# Patient Record
Sex: FEMALE | Race: WHITE | NOT HISPANIC OR LATINO
[De-identification: names, ages, dates, MRNs, and addresses within clinical notes are randomized per-mention and may not be internally consistent; named-entity substitution may affect disease eponyms.]

---

## 2017-03-06 ENCOUNTER — APPOINTMENT (OUTPATIENT)
Dept: CARDIOLOGY | Facility: CLINIC | Age: 82
End: 2017-03-06

## 2017-03-06 ENCOUNTER — NON-APPOINTMENT (OUTPATIENT)
Age: 82
End: 2017-03-06

## 2017-03-06 VITALS
HEIGHT: 58 IN | DIASTOLIC BLOOD PRESSURE: 64 MMHG | WEIGHT: 138 LBS | BODY MASS INDEX: 28.97 KG/M2 | HEART RATE: 73 BPM | OXYGEN SATURATION: 95 % | SYSTOLIC BLOOD PRESSURE: 116 MMHG

## 2017-03-06 DIAGNOSIS — C92.10 CHRONIC MYELOID LEUKEMIA, BCR/ABL-POSITIVE, NOT HAVING ACHIEVED REMISSION: ICD-10-CM

## 2017-03-06 DIAGNOSIS — Z45.018 ENCOUNTER FOR ADJUSTMENT AND MANAGEMENT OF OTHER PART OF CARDIAC PACEMAKER: ICD-10-CM

## 2017-04-06 ENCOUNTER — EMERGENCY (EMERGENCY)
Facility: HOSPITAL | Age: 82
LOS: 0 days | Discharge: HOME | End: 2017-04-06
Attending: EMERGENCY MEDICINE
Payer: MEDICARE

## 2017-04-06 VITALS
TEMPERATURE: 97 F | WEIGHT: 130.07 LBS | SYSTOLIC BLOOD PRESSURE: 103 MMHG | OXYGEN SATURATION: 94 % | HEIGHT: 59 IN | HEART RATE: 87 BPM | DIASTOLIC BLOOD PRESSURE: 76 MMHG | RESPIRATION RATE: 18 BRPM

## 2017-04-06 VITALS
SYSTOLIC BLOOD PRESSURE: 131 MMHG | RESPIRATION RATE: 19 BRPM | OXYGEN SATURATION: 100 % | TEMPERATURE: 98 F | HEART RATE: 80 BPM | DIASTOLIC BLOOD PRESSURE: 52 MMHG

## 2017-04-06 DIAGNOSIS — S81.811A LACERATION WITHOUT FOREIGN BODY, RIGHT LOWER LEG, INITIAL ENCOUNTER: ICD-10-CM
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
## 2017-04-06 DIAGNOSIS — Z79.82 LONG TERM (CURRENT) USE OF ASPIRIN: ICD-10-CM

## 2017-04-06 PROCEDURE — 99283 EMERGENCY DEPT VISIT LOW MDM: CPT

## 2017-04-06 NOTE — ED PROVIDER NOTE - PHYSICAL EXAMINATION
skin - large 5x4 cm skin tear mid anterior shin, minimally oozing. posterior right like is a clean rose sized ulceration that appears chronic. the right toes are also bandaged with cdi dressing. numerous surgical scars.   neuro - alert and oriented  msk - no bony ttp of the right leg.   gen - eldery, frail, no distress

## 2017-04-06 NOTE — ED PROVIDER NOTE - OBJECTIVE STATEMENT
Pertinent PMH/PSH/FHx/SHx and Review of Systems contained within:  89F hx of PVD pw skin tear right anterior shin. overnight, she slipped as her  was unable to successfully navigate her from the wheel chair to the bed. patient did not hit head or sustain loc. tetanus within the past 2 years  Fh and Sh not otherwise contributory  ROS otherwise negative

## 2017-04-06 NOTE — ED ADULT NURSE REASSESSMENT NOTE - NS ED NURSE REASSESS COMMENT FT1
pt awake, alert, right leg bandage with xeroform dressing and kerlex placed by md on arrival. family at bedside. seen by sw. awaiting ambulance home.

## 2017-04-06 NOTE — ED ADULT NURSE REASSESSMENT NOTE - NS ED NURSE REASSESS COMMENT FT1
pt full self ambulatory with steady gait, alert, orient x 3. belongings returned by security. pt refuse to sign for property and reufse to sign discharge orders

## 2017-04-06 NOTE — ED ADULT NURSE NOTE - PMH
Anemia    CAD (coronary artery disease)    CML (chronic myelocytic leukemia)    Macular degeneration    Osteoarthritis    Pacemaker    Sick sinus syndrome

## 2017-05-10 ENCOUNTER — NON-APPOINTMENT (OUTPATIENT)
Age: 82
End: 2017-05-10

## 2017-05-10 ENCOUNTER — APPOINTMENT (OUTPATIENT)
Dept: CARDIOLOGY | Facility: CLINIC | Age: 82
End: 2017-05-10

## 2017-05-10 VITALS
HEART RATE: 73 BPM | HEIGHT: 58 IN | WEIGHT: 138 LBS | OXYGEN SATURATION: 95 % | SYSTOLIC BLOOD PRESSURE: 118 MMHG | BODY MASS INDEX: 28.97 KG/M2 | DIASTOLIC BLOOD PRESSURE: 60 MMHG

## 2017-05-10 DIAGNOSIS — I10 ESSENTIAL (PRIMARY) HYPERTENSION: ICD-10-CM

## 2017-05-10 DIAGNOSIS — Z01.810 ENCOUNTER FOR PREPROCEDURAL CARDIOVASCULAR EXAMINATION: ICD-10-CM

## 2017-05-10 DIAGNOSIS — I35.0 NONRHEUMATIC AORTIC (VALVE) STENOSIS: ICD-10-CM

## 2017-05-10 DIAGNOSIS — I50.32 CHRONIC DIASTOLIC (CONGESTIVE) HEART FAILURE: ICD-10-CM

## 2017-05-10 DIAGNOSIS — Z95.810 PRESENCE OF AUTOMATIC (IMPLANTABLE) CARDIAC DEFIBRILLATOR: ICD-10-CM

## 2017-05-23 PROBLEM — Z01.810 PREOPERATIVE CARDIOVASCULAR EXAMINATION: Status: ACTIVE | Noted: 2017-05-23

## 2017-06-07 ENCOUNTER — APPOINTMENT (OUTPATIENT)
Dept: CARDIOLOGY | Facility: CLINIC | Age: 82
End: 2017-06-07

## 2017-06-25 ENCOUNTER — EMERGENCY (EMERGENCY)
Facility: HOSPITAL | Age: 82
LOS: 0 days | Discharge: ANOTHER HOSPITAL | End: 2017-06-26
Attending: EMERGENCY MEDICINE
Payer: MEDICARE

## 2017-06-25 VITALS
HEART RATE: 73 BPM | RESPIRATION RATE: 17 BRPM | TEMPERATURE: 98 F | SYSTOLIC BLOOD PRESSURE: 110 MMHG | HEIGHT: 60 IN | WEIGHT: 110.01 LBS | OXYGEN SATURATION: 100 % | DIASTOLIC BLOOD PRESSURE: 47 MMHG

## 2017-06-25 VITALS
SYSTOLIC BLOOD PRESSURE: 107 MMHG | RESPIRATION RATE: 18 BRPM | TEMPERATURE: 97 F | OXYGEN SATURATION: 97 % | DIASTOLIC BLOOD PRESSURE: 56 MMHG | HEART RATE: 73 BPM

## 2017-06-25 LAB
ALBUMIN SERPL ELPH-MCNC: 2.8 G/DL — LOW (ref 3.3–5)
ALP SERPL-CCNC: 116 U/L — SIGNIFICANT CHANGE UP (ref 40–120)
ALT FLD-CCNC: 21 U/L — SIGNIFICANT CHANGE UP (ref 12–78)
ANION GAP SERPL CALC-SCNC: 11 MMOL/L — SIGNIFICANT CHANGE UP (ref 5–17)
ANISOCYTOSIS BLD QL: SLIGHT — SIGNIFICANT CHANGE UP
APPEARANCE UR: CLEAR — SIGNIFICANT CHANGE UP
APTT BLD: 28.7 SEC — SIGNIFICANT CHANGE UP (ref 27.5–37.4)
AST SERPL-CCNC: 17 U/L — SIGNIFICANT CHANGE UP (ref 15–37)
BACTERIA # UR AUTO: ABNORMAL
BILIRUB SERPL-MCNC: 0.3 MG/DL — SIGNIFICANT CHANGE UP (ref 0.2–1.2)
BILIRUB UR-MCNC: NEGATIVE — SIGNIFICANT CHANGE UP
BLD GP AB SCN SERPL QL: SIGNIFICANT CHANGE UP
BUN SERPL-MCNC: 48 MG/DL — HIGH (ref 7–23)
CALCIUM SERPL-MCNC: 7.8 MG/DL — LOW (ref 8.5–10.1)
CHLORIDE SERPL-SCNC: 112 MMOL/L — HIGH (ref 96–108)
CK MB BLD-MCNC: 5.2 % — HIGH (ref 0–3.5)
CK MB CFR SERPL CALC: 4.7 NG/ML — HIGH (ref 0.5–3.6)
CK SERPL-CCNC: 91 U/L — SIGNIFICANT CHANGE UP (ref 26–192)
CO2 SERPL-SCNC: 21 MMOL/L — LOW (ref 22–31)
COLOR SPEC: YELLOW — SIGNIFICANT CHANGE UP
CREAT SERPL-MCNC: 1.85 MG/DL — HIGH (ref 0.5–1.3)
DIFF PNL FLD: NEGATIVE — SIGNIFICANT CHANGE UP
EOSINOPHIL NFR BLD AUTO: 3 % — SIGNIFICANT CHANGE UP (ref 0–6)
EPI CELLS # UR: SIGNIFICANT CHANGE UP
GLUCOSE SERPL-MCNC: 116 MG/DL — HIGH (ref 70–99)
GLUCOSE UR QL: NEGATIVE MG/DL — SIGNIFICANT CHANGE UP
HCT VFR BLD CALC: 27.2 % — LOW (ref 34.5–45)
HGB BLD-MCNC: 9.5 G/DL — LOW (ref 11.5–15.5)
HYPOCHROMIA BLD QL: SLIGHT — SIGNIFICANT CHANGE UP
INR BLD: 1.24 RATIO — HIGH (ref 0.88–1.16)
KETONES UR-MCNC: NEGATIVE — SIGNIFICANT CHANGE UP
LACTATE SERPL-SCNC: 0.8 MMOL/L — SIGNIFICANT CHANGE UP (ref 0.7–2)
LEUKOCYTE ESTERASE UR-ACNC: ABNORMAL
LYMPHOCYTES # BLD AUTO: 14 % — SIGNIFICANT CHANGE UP (ref 13–44)
MACROCYTES BLD QL: SLIGHT — SIGNIFICANT CHANGE UP
MCHC RBC-ENTMCNC: 35.2 GM/DL — SIGNIFICANT CHANGE UP (ref 32–36)
MCHC RBC-ENTMCNC: 36.3 PG — HIGH (ref 27–34)
MCV RBC AUTO: 103.2 FL — HIGH (ref 80–100)
MICROCYTES BLD QL: SLIGHT — SIGNIFICANT CHANGE UP
MONOCYTES NFR BLD AUTO: 4 % — SIGNIFICANT CHANGE UP (ref 2–14)
NEUTROPHILS NFR BLD AUTO: 79 % — HIGH (ref 43–77)
NITRITE UR-MCNC: NEGATIVE — SIGNIFICANT CHANGE UP
NRBC # BLD: 1 /100 — HIGH (ref 0–0)
OVALOCYTES BLD QL SMEAR: SLIGHT — SIGNIFICANT CHANGE UP
PH UR: 5 — SIGNIFICANT CHANGE UP (ref 5–8)
PLAT MORPH BLD: NORMAL — SIGNIFICANT CHANGE UP
PLATELET # BLD AUTO: 151 K/UL — SIGNIFICANT CHANGE UP (ref 150–400)
POLYCHROMASIA BLD QL SMEAR: SLIGHT — SIGNIFICANT CHANGE UP
POTASSIUM SERPL-MCNC: 4 MMOL/L — SIGNIFICANT CHANGE UP (ref 3.5–5.3)
POTASSIUM SERPL-SCNC: 4 MMOL/L — SIGNIFICANT CHANGE UP (ref 3.5–5.3)
PROT SERPL-MCNC: 6.2 GM/DL — SIGNIFICANT CHANGE UP (ref 6–8.3)
PROT UR-MCNC: NEGATIVE MG/DL — SIGNIFICANT CHANGE UP
PROTHROM AB SERPL-ACNC: 13.6 SEC — HIGH (ref 9.8–12.7)
RBC # BLD: 2.63 M/UL — LOW (ref 3.8–5.2)
RBC # FLD: 14.1 % — SIGNIFICANT CHANGE UP (ref 11–15)
RBC BLD AUTO: ABNORMAL
SODIUM SERPL-SCNC: 144 MMOL/L — SIGNIFICANT CHANGE UP (ref 135–145)
SP GR SPEC: 1.01 — SIGNIFICANT CHANGE UP (ref 1.01–1.02)
TROPONIN I SERPL-MCNC: 0.06 NG/ML — HIGH (ref 0.01–0.04)
UROBILINOGEN FLD QL: NEGATIVE MG/DL — SIGNIFICANT CHANGE UP
WBC # BLD: 9.6 K/UL — SIGNIFICANT CHANGE UP (ref 3.8–10.5)
WBC # FLD AUTO: 9.6 K/UL — SIGNIFICANT CHANGE UP (ref 3.8–10.5)
WBC UR QL: ABNORMAL

## 2017-06-25 PROCEDURE — 71010: CPT | Mod: 26

## 2017-06-25 PROCEDURE — 76377 3D RENDER W/INTRP POSTPROCES: CPT | Mod: 26

## 2017-06-25 PROCEDURE — 73130 X-RAY EXAM OF HAND: CPT | Mod: 26,RT

## 2017-06-25 PROCEDURE — 72100 X-RAY EXAM L-S SPINE 2/3 VWS: CPT | Mod: 26

## 2017-06-25 PROCEDURE — 74176 CT ABD & PELVIS W/O CONTRAST: CPT | Mod: 26

## 2017-06-25 PROCEDURE — 70450 CT HEAD/BRAIN W/O DYE: CPT | Mod: 26

## 2017-06-25 PROCEDURE — 99285 EMERGENCY DEPT VISIT HI MDM: CPT

## 2017-06-25 PROCEDURE — 72125 CT NECK SPINE W/O DYE: CPT | Mod: 26

## 2017-06-25 RX ORDER — SODIUM CHLORIDE 9 MG/ML
500 INJECTION INTRAMUSCULAR; INTRAVENOUS; SUBCUTANEOUS ONCE
Qty: 0 | Refills: 0 | Status: COMPLETED | OUTPATIENT
Start: 2017-06-25 | End: 2017-06-25

## 2017-06-25 RX ORDER — SODIUM CHLORIDE 9 MG/ML
3 INJECTION INTRAMUSCULAR; INTRAVENOUS; SUBCUTANEOUS ONCE
Qty: 0 | Refills: 0 | Status: COMPLETED | OUTPATIENT
Start: 2017-06-25 | End: 2017-06-25

## 2017-06-25 RX ADMIN — SODIUM CHLORIDE 3 MILLILITER(S): 9 INJECTION INTRAMUSCULAR; INTRAVENOUS; SUBCUTANEOUS at 22:08

## 2017-06-25 NOTE — ED PROVIDER NOTE - PHYSICAL EXAMINATION
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    BACK:  nt midline c/t spine. ttp @ L4/5  Skin: skin tears to right hand.  ecchymoses over arms and legs.  purulent discharge from right foot  Neuro: AAOx2, no sensory/motor deficits, CN 2-12 intact

## 2017-06-25 NOTE — ED ADULT NURSE NOTE - OBJECTIVE STATEMENT
Pt c/o generalized fall s/p at home on walker. Pt presented with a lac to rt hand from fal.. Pt has hx of poor circulation to bilateral lower extremity ;hence unhealing wounds/cellulities/edema to lower extremity.

## 2017-06-25 NOTE — ED PROVIDER NOTE - OBJECTIVE STATEMENT
89yoF; with pmh signif for CML (in remission, on Glevac and Procrit injections), HTN, HLD, CAD, Sick Sinus Syndrome(s/p PPM); now presenting s/p fall.  patient with 3 falls this week.  patient states she may have tripped over walker but not sure. unknown head trauma.  c/o generalized weakness over past week.  denies cp/sob/palp. denies f/c/s. denies cough. denies abd pain. c/o lower back pain and right hand pain.

## 2017-06-25 NOTE — ED PROVIDER NOTE - CARE PLAN
Principal Discharge DX:	NSTEMI, initial episode of care  Secondary Diagnosis:	Unsteady gait Principal Discharge DX:	NSTEMI, initial episode of care  Secondary Diagnosis:	Unsteady gait  Secondary Diagnosis:	Pelvic fracture

## 2017-06-25 NOTE — ED ADULT TRIAGE NOTE - CHIEF COMPLAINT QUOTE
Patient BIBA: patient reports "I got tangled in my walker. Everything hurts." Patient with skin tear to Right hand. Patient denies striking head.

## 2017-06-26 ENCOUNTER — INPATIENT (INPATIENT)
Facility: HOSPITAL | Age: 82
LOS: 1 days | Discharge: HOSPICE | DRG: 535 | End: 2017-06-28
Attending: SURGERY | Admitting: SURGERY
Payer: MEDICARE

## 2017-06-26 VITALS
DIASTOLIC BLOOD PRESSURE: 58 MMHG | OXYGEN SATURATION: 100 % | HEART RATE: 82 BPM | SYSTOLIC BLOOD PRESSURE: 75 MMHG | TEMPERATURE: 98 F | RESPIRATION RATE: 24 BRPM

## 2017-06-26 DIAGNOSIS — Z95.810 PRESENCE OF AUTOMATIC (IMPLANTABLE) CARDIAC DEFIBRILLATOR: Chronic | ICD-10-CM

## 2017-06-26 DIAGNOSIS — S32.9XXA FRACTURE OF UNSPECIFIED PARTS OF LUMBOSACRAL SPINE AND PELVIS, INITIAL ENCOUNTER FOR CLOSED FRACTURE: ICD-10-CM

## 2017-06-26 DIAGNOSIS — T14.90 INJURY, UNSPECIFIED: ICD-10-CM

## 2017-06-26 LAB
ALBUMIN SERPL ELPH-MCNC: 3 G/DL — LOW (ref 3.3–5)
ALP SERPL-CCNC: 95 U/L — SIGNIFICANT CHANGE UP (ref 40–120)
ALT FLD-CCNC: 39 U/L RC — SIGNIFICANT CHANGE UP (ref 10–45)
ANION GAP SERPL CALC-SCNC: 9 MMOL/L — SIGNIFICANT CHANGE UP (ref 5–17)
ANISOCYTOSIS BLD QL: SLIGHT — SIGNIFICANT CHANGE UP
APTT BLD: 31.3 SEC — SIGNIFICANT CHANGE UP (ref 27.5–37.4)
AST SERPL-CCNC: 41 U/L — HIGH (ref 10–40)
BASOPHILS # BLD AUTO: 0 K/UL — SIGNIFICANT CHANGE UP (ref 0–0.2)
BASOPHILS NFR BLD AUTO: 0.4 % — SIGNIFICANT CHANGE UP (ref 0–2)
BILIRUB SERPL-MCNC: 1 MG/DL — SIGNIFICANT CHANGE UP (ref 0.2–1.2)
BLD GP AB SCN SERPL QL: NEGATIVE — SIGNIFICANT CHANGE UP
BUN SERPL-MCNC: 47 MG/DL — HIGH (ref 7–23)
CALCIUM SERPL-MCNC: 7.7 MG/DL — LOW (ref 8.4–10.5)
CHLORIDE SERPL-SCNC: 110 MMOL/L — HIGH (ref 96–108)
CK SERPL-CCNC: 105 U/L — SIGNIFICANT CHANGE UP (ref 25–170)
CO2 SERPL-SCNC: 23 MMOL/L — SIGNIFICANT CHANGE UP (ref 22–31)
CREAT SERPL-MCNC: 1.62 MG/DL — HIGH (ref 0.5–1.3)
CRP SERPL-MCNC: 3.4 MG/DL — HIGH (ref 0–0.4)
EOSINOPHIL # BLD AUTO: 0.3 K/UL — SIGNIFICANT CHANGE UP (ref 0–0.5)
EOSINOPHIL NFR BLD AUTO: 2.7 % — SIGNIFICANT CHANGE UP (ref 0–6)
ERYTHROCYTE [SEDIMENTATION RATE] IN BLOOD: 23 MM/HR — HIGH (ref 0–20)
GAS PNL BLDA: SIGNIFICANT CHANGE UP
GLUCOSE SERPL-MCNC: 135 MG/DL — HIGH (ref 70–99)
HCT VFR BLD CALC: 27.9 % — LOW (ref 34.5–45)
HCT VFR BLD CALC: 29.5 % — LOW (ref 34.5–45)
HGB BLD-MCNC: 9.4 G/DL — LOW (ref 11.5–15.5)
HGB BLD-MCNC: 9.8 G/DL — LOW (ref 11.5–15.5)
INR BLD: 1.32 RATIO — HIGH (ref 0.88–1.16)
LYMPHOCYTES # BLD AUTO: 1.3 K/UL — SIGNIFICANT CHANGE UP (ref 1–3.3)
LYMPHOCYTES # BLD AUTO: 12.2 % — LOW (ref 13–44)
MACROCYTES BLD QL: SLIGHT — SIGNIFICANT CHANGE UP
MCHC RBC-ENTMCNC: 33.2 GM/DL — SIGNIFICANT CHANGE UP (ref 32–36)
MCHC RBC-ENTMCNC: 33.7 GM/DL — SIGNIFICANT CHANGE UP (ref 32–36)
MCHC RBC-ENTMCNC: 35.3 PG — HIGH (ref 27–34)
MCHC RBC-ENTMCNC: 35.8 PG — HIGH (ref 27–34)
MCV RBC AUTO: 106 FL — HIGH (ref 80–100)
MCV RBC AUTO: 106 FL — HIGH (ref 80–100)
MONOCYTES # BLD AUTO: 1.2 K/UL — HIGH (ref 0–0.9)
MONOCYTES NFR BLD AUTO: 11 % — SIGNIFICANT CHANGE UP (ref 2–14)
NEUTROPHILS # BLD AUTO: 8.1 K/UL — HIGH (ref 1.8–7.4)
NEUTROPHILS NFR BLD AUTO: 73.8 % — SIGNIFICANT CHANGE UP (ref 43–77)
NT-PROBNP SERPL-SCNC: HIGH PG/ML (ref 0–300)
OVALOCYTES BLD QL SMEAR: SLIGHT — SIGNIFICANT CHANGE UP
PLAT MORPH BLD: NORMAL — SIGNIFICANT CHANGE UP
PLATELET # BLD AUTO: 103 K/UL — LOW (ref 150–400)
PLATELET # BLD AUTO: 145 K/UL — LOW (ref 150–400)
POIKILOCYTOSIS BLD QL AUTO: SLIGHT — SIGNIFICANT CHANGE UP
POTASSIUM SERPL-MCNC: 3.7 MMOL/L — SIGNIFICANT CHANGE UP (ref 3.5–5.3)
POTASSIUM SERPL-SCNC: 3.7 MMOL/L — SIGNIFICANT CHANGE UP (ref 3.5–5.3)
PROT SERPL-MCNC: 5.7 G/DL — LOW (ref 6–8.3)
PROTHROM AB SERPL-ACNC: 14.3 SEC — HIGH (ref 9.8–12.7)
RBC # BLD: 2.62 M/UL — LOW (ref 3.8–5.2)
RBC # BLD: 2.77 M/UL — LOW (ref 3.8–5.2)
RBC # FLD: 16.8 % — HIGH (ref 10.3–14.5)
RBC # FLD: 17 % — HIGH (ref 10.3–14.5)
RBC BLD AUTO: SIGNIFICANT CHANGE UP
RH IG SCN BLD-IMP: POSITIVE — SIGNIFICANT CHANGE UP
RH IG SCN BLD-IMP: POSITIVE — SIGNIFICANT CHANGE UP
SODIUM SERPL-SCNC: 142 MMOL/L — SIGNIFICANT CHANGE UP (ref 135–145)
TROPONIN T SERPL-MCNC: 0.06 NG/ML — SIGNIFICANT CHANGE UP (ref 0–0.06)
WBC # BLD: 10.9 K/UL — HIGH (ref 3.8–10.5)
WBC # BLD: 9.6 K/UL — SIGNIFICANT CHANGE UP (ref 3.8–10.5)
WBC # FLD AUTO: 10.9 K/UL — HIGH (ref 3.8–10.5)
WBC # FLD AUTO: 9.6 K/UL — SIGNIFICANT CHANGE UP (ref 3.8–10.5)

## 2017-06-26 PROCEDURE — 72125 CT NECK SPINE W/O DYE: CPT | Mod: 26

## 2017-06-26 PROCEDURE — 74177 CT ABD & PELVIS W/CONTRAST: CPT | Mod: 26

## 2017-06-26 PROCEDURE — 99291 CRITICAL CARE FIRST HOUR: CPT

## 2017-06-26 PROCEDURE — 99292 CRITICAL CARE ADDL 30 MIN: CPT

## 2017-06-26 PROCEDURE — 71260 CT THORAX DX C+: CPT | Mod: 26

## 2017-06-26 PROCEDURE — 70450 CT HEAD/BRAIN W/O DYE: CPT | Mod: 26

## 2017-06-26 PROCEDURE — 99291 CRITICAL CARE FIRST HOUR: CPT | Mod: 25

## 2017-06-26 PROCEDURE — 71010: CPT | Mod: 26

## 2017-06-26 PROCEDURE — 73630 X-RAY EXAM OF FOOT: CPT | Mod: 26,RT

## 2017-06-26 RX ORDER — ACETAMINOPHEN 500 MG
1000 TABLET ORAL ONCE
Qty: 0 | Refills: 0 | Status: COMPLETED | OUTPATIENT
Start: 2017-06-26 | End: 2017-06-26

## 2017-06-26 RX ORDER — SODIUM CHLORIDE 9 MG/ML
1000 INJECTION, SOLUTION INTRAVENOUS
Qty: 0 | Refills: 0 | Status: DISCONTINUED | OUTPATIENT
Start: 2017-06-26 | End: 2017-06-26

## 2017-06-26 RX ORDER — MIDAZOLAM HYDROCHLORIDE 1 MG/ML
1 INJECTION, SOLUTION INTRAMUSCULAR; INTRAVENOUS ONCE
Qty: 0 | Refills: 0 | Status: DISCONTINUED | OUTPATIENT
Start: 2017-06-26 | End: 2017-06-26

## 2017-06-26 RX ORDER — FENTANYL CITRATE 50 UG/ML
25 INJECTION INTRAVENOUS ONCE
Qty: 0 | Refills: 0 | Status: DISCONTINUED | OUTPATIENT
Start: 2017-06-26 | End: 2017-06-26

## 2017-06-26 RX ORDER — TETANUS TOXOID, REDUCED DIPHTHERIA TOXOID AND ACELLULAR PERTUSSIS VACCINE, ADSORBED 5; 2.5; 8; 8; 2.5 [IU]/.5ML; [IU]/.5ML; UG/.5ML; UG/.5ML; UG/.5ML
0.5 SUSPENSION INTRAMUSCULAR ONCE
Qty: 0 | Refills: 0 | Status: COMPLETED | OUTPATIENT
Start: 2017-06-26 | End: 2017-06-26

## 2017-06-26 RX ORDER — HYDROMORPHONE HYDROCHLORIDE 2 MG/ML
0.25 INJECTION INTRAMUSCULAR; INTRAVENOUS; SUBCUTANEOUS ONCE
Qty: 0 | Refills: 0 | Status: DISCONTINUED | OUTPATIENT
Start: 2017-06-26 | End: 2017-06-26

## 2017-06-26 RX ORDER — DESMOPRESSIN ACETATE 0.1 MG/1
20 TABLET ORAL ONCE
Qty: 0 | Refills: 0 | Status: COMPLETED | OUTPATIENT
Start: 2017-06-26 | End: 2017-06-26

## 2017-06-26 RX ORDER — POTASSIUM CHLORIDE 20 MEQ
10 PACKET (EA) ORAL
Qty: 0 | Refills: 0 | Status: COMPLETED | OUTPATIENT
Start: 2017-06-26 | End: 2017-06-26

## 2017-06-26 RX ORDER — LIDOCAINE 4 G/100G
1 CREAM TOPICAL DAILY
Qty: 0 | Refills: 0 | Status: DISCONTINUED | OUTPATIENT
Start: 2017-06-26 | End: 2017-06-28

## 2017-06-26 RX ORDER — DESMOPRESSIN ACETATE 0.1 MG/1
20 TABLET ORAL ONCE
Qty: 0 | Refills: 0 | Status: DISCONTINUED | OUTPATIENT
Start: 2017-06-26 | End: 2017-06-26

## 2017-06-26 RX ORDER — ACETAMINOPHEN 500 MG
650 TABLET ORAL EVERY 6 HOURS
Qty: 0 | Refills: 0 | Status: DISCONTINUED | OUTPATIENT
Start: 2017-06-26 | End: 2017-06-26

## 2017-06-26 RX ORDER — DEXTROSE MONOHYDRATE, SODIUM CHLORIDE, AND POTASSIUM CHLORIDE 50; .745; 4.5 G/1000ML; G/1000ML; G/1000ML
1000 INJECTION, SOLUTION INTRAVENOUS
Qty: 0 | Refills: 0 | Status: DISCONTINUED | OUTPATIENT
Start: 2017-06-26 | End: 2017-06-27

## 2017-06-26 RX ORDER — ACETAMINOPHEN 500 MG
650 TABLET ORAL EVERY 6 HOURS
Qty: 0 | Refills: 0 | Status: DISCONTINUED | OUTPATIENT
Start: 2017-06-26 | End: 2017-06-28

## 2017-06-26 RX ORDER — HYDROMORPHONE HYDROCHLORIDE 2 MG/ML
0.25 INJECTION INTRAMUSCULAR; INTRAVENOUS; SUBCUTANEOUS
Qty: 0 | Refills: 0 | Status: DISCONTINUED | OUTPATIENT
Start: 2017-06-26 | End: 2017-06-27

## 2017-06-26 RX ADMIN — TETANUS TOXOID, REDUCED DIPHTHERIA TOXOID AND ACELLULAR PERTUSSIS VACCINE, ADSORBED 0.5 MILLILITER(S): 5; 2.5; 8; 8; 2.5 SUSPENSION INTRAMUSCULAR at 00:27

## 2017-06-26 RX ADMIN — Medication 400 MILLIGRAM(S): at 10:31

## 2017-06-26 RX ADMIN — Medication 1000 MILLIGRAM(S): at 14:14

## 2017-06-26 RX ADMIN — Medication 650 MILLIGRAM(S): at 14:15

## 2017-06-26 RX ADMIN — FENTANYL CITRATE 25 MICROGRAM(S): 50 INJECTION INTRAVENOUS at 00:24

## 2017-06-26 RX ADMIN — HYDROMORPHONE HYDROCHLORIDE 0.25 MILLIGRAM(S): 2 INJECTION INTRAMUSCULAR; INTRAVENOUS; SUBCUTANEOUS at 05:30

## 2017-06-26 RX ADMIN — Medication 650 MILLIGRAM(S): at 21:12

## 2017-06-26 RX ADMIN — LIDOCAINE 1 PATCH: 4 CREAM TOPICAL at 12:15

## 2017-06-26 RX ADMIN — DEXTROSE MONOHYDRATE, SODIUM CHLORIDE, AND POTASSIUM CHLORIDE 50 MILLILITER(S): 50; .745; 4.5 INJECTION, SOLUTION INTRAVENOUS at 06:32

## 2017-06-26 RX ADMIN — SODIUM CHLORIDE 500 MILLILITER(S): 9 INJECTION INTRAMUSCULAR; INTRAVENOUS; SUBCUTANEOUS at 00:24

## 2017-06-26 RX ADMIN — Medication 100 MILLIEQUIVALENT(S): at 08:30

## 2017-06-26 RX ADMIN — HYDROMORPHONE HYDROCHLORIDE 0.25 MILLIGRAM(S): 2 INJECTION INTRAMUSCULAR; INTRAVENOUS; SUBCUTANEOUS at 05:10

## 2017-06-26 RX ADMIN — DESMOPRESSIN ACETATE 220 MICROGRAM(S): 0.1 TABLET ORAL at 03:20

## 2017-06-26 RX ADMIN — Medication 100 MILLIEQUIVALENT(S): at 09:14

## 2017-06-26 RX ADMIN — Medication 100 MILLIEQUIVALENT(S): at 06:37

## 2017-06-26 RX ADMIN — MIDAZOLAM HYDROCHLORIDE 1 MILLIGRAM(S): 1 INJECTION, SOLUTION INTRAMUSCULAR; INTRAVENOUS at 02:42

## 2017-06-26 NOTE — H&P ADULT - PROBLEM SELECTOR PLAN 1
-Admit to SICU  -NPO  -Confirm medication list and past medical history with family and PMD  -Trend Hct, Transfuse PRN  -Continue pelvic binder  -Incentive Spirometer  -Repeat labs  -Advance otoole further into bladder  -Ortho consult for pelvic fractures  -Discussed with attending

## 2017-06-26 NOTE — CONSULT NOTE ADULT - ASSESSMENT
89F s/p fall w/ L 8-9 rib fx, R iliac wing fracture, R flank hematoma    Neuro: Pain control w/ IV tylenol, lidoderm patch, low dose narcotics PRN. Monitor for mental status changes    Resp: OOB, Pulmonary toilet, Incentive spirometry    CV: mIVF. Monitor for hypotension/tachycardia. Avoid beta-blockers at this time.    GI: NPO for now.    : Monitor UOP. C/w otoole. Replete electrolytes.    Heme: Trend CBC. Hold VTE chemoppx at this time until H/H stable.     ID: Monitor    Endo: Monitor BMP glucose    Dispo: DNR. Will likely obtain palliative care consult for goals of care discussion. Admit to SICU

## 2017-06-26 NOTE — H&P ADULT - PMH
CML (chronic myeloid leukemia)    Coronary artery disease    Macular degeneration    Osteoarthritis    Pacemaker    Sick sinus syndrome

## 2017-06-26 NOTE — H&P ADULT - HISTORY OF PRESENT ILLNESS
89F s/p multiple mechanical falls this week. +Head trauma. +LOC. GCS 13. During primary survey pt's SBP was in the 60s-70s. Pt received 3 liters IVF, 2 units pRBCs, and 1 unit FFP. Her blood pressure responded. A R IJ central line was placed for additional access. After blood pressure was stabilized primary survey was intact. Secondary survey notable for ecchymosis on b/l UE, skin tears on R hand and R knee, dried blood in  R ear, pelvic binder in place.     Unable to obtain most of medical history. Medication reconciliation will need to be completed in the morning. ER attending discussed with family over the phone and pt was made DNR/DNI.

## 2017-06-26 NOTE — ED ADULT NURSE REASSESSMENT NOTE - NS ED NURSE REASSESS COMMENT FT1
pt admitted and accepted to SICU, report given at 0455 to HANNA Mar. pts bp varies between normal and HTN. DNR/DNI in chart signed and completed. blood transfusion consent signed and in pts chart. unit of platelets infused and completed at 0450, RN Zaid made aware in SICU. repeat VS to be completed at 0520. pt pending transfer.

## 2017-06-26 NOTE — ED PROVIDER NOTE - CRITICAL CARE PROVIDED
conducted a detailed discussion of DNR status/telephone consultation with the patient's family/consult w/ pt's family directly relating to pts condition/additional history taking/direct patient care (not related to procedure)/documentation/interpretation of diagnostic studies

## 2017-06-26 NOTE — ED ADULT NURSE REASSESSMENT NOTE - NS ED NURSE REASSESS COMMENT FT1
pt awake not responsive to verbal commands, responsive to painful stimuli. remains on nrb at 100% RA. pt administered 20 mcg of DDAVP at 0320, tolerated well. unit of platelets currently infusing. pt tba. pending admission. bp stable. abdominal and pelvic binder remains in place, otoole patent and draining to gravity. central line dressing clean, dry and intact, all ports patent and flushes well. all port clamped. 40 mg of lasix administered IVP as per ED attending Marlyn for possible overload d/t blood products administration. lab work post PRBC infusions with hgb of 9.8. type and screens obtained and blood type resulted. safety precautions maintained and comfort measures provided.

## 2017-06-26 NOTE — ED PROVIDER NOTE - OBJECTIVE STATEMENT
Chata Saunders M.D: 89yoF presenting as transfer from [facility] for trauma, multiple pelvic fractures level 2 trauma called on arrival.   A- airway intact, pt moaning making noise  B- b/l breath sounds  C- hypotensive bps 70s/50s; fluid responsive. +distal pulses  D- GCS 10 [id number]  E- gross deformity to pelvis; pelvic binder in place.

## 2017-06-26 NOTE — ED PROVIDER NOTE - PMH
<<----- Click to add NO pertinent Past Medical History No pertinent past medical history CML (chronic myeloid leukemia)    Coronary artery disease    Macular degeneration    Osteoarthritis    Pacemaker    Sick sinus syndrome

## 2017-06-26 NOTE — H&P ADULT - NSHPLABSRESULTS_GEN_ALL_CORE
LABS  CBC (06-26 @ 03:03)                              9.8<L>                         10.9<H>  )----------------(  103<L>     --    % Neutrophils, --    % Lymphocytes, ANC: --                                  29.5<L>    BMP (06-26 @ 03:03)             142     |  110<H>  |  47<H> 		Ca++ --      Ca 7.7<L>             ---------------------------------( 135<H>		Mg --                 3.7     |  23      |  1.62<H>			Ph --        LFTs (06-26 @ 03:03)      TPro 5.7<L> / Alb 3.0<L> / TBili 1.0 / DBili -- / AST 41<H> / ALT 39 / AlkPhos 95    Coags (06-26 @ 03:03)  aPTT 31.3 / INR 1.32<H> / PT 14.3<H>    Cardiac Markers (06-26 @ 03:03)     Trop: 0.06 -- / CKMB: -- / CK: 105        --------------------------------------------------------------------------------------------    MICROBIOLOGY      --------------------------------------------------------------------------------------------

## 2017-06-26 NOTE — H&P ADULT - NSHPPHYSICALEXAM_GEN_ALL_CORE
Vitals:   T(C): 36.4, Max: 36.4 (06-26 @ 02:32)  HR: 88 (82 - 89)  BP: 120/81 (75/58 - 132/95)  BP(mean): --  RR: 22 (22 - 26)  SpO2: 18% (18% - 100%)  Wt(kg): --  CAPILLARY BLOOD GLUCOSE    CAPILLARY BLOOD GLUCOSE    PHYSICAL EXAM:   General: NAD, pt screaming and moaning  Neuro: A+Ox1, CNs appear to be grossly intact, motor intact in all four extremities  HEENT: NC/AT, EOMI, dried blood in right ear  Neck: Soft, supple, no tracheal deviation or JVD  Cardio: RRR, nml S1/S2  Resp: Good effort, CTA b/l  Thorax: No chest wall tenderness  Breast: No lesions/masses, no drainage  GI/Abd: Soft, NT/ND, no rebound/guarding, no masses palpated  Pelvis: sheet around pelvis - switched for pelvic binder  Vascular: All 4 extremities warm.  Skin: ecchymosis along b/l UE. R knee skin tear. R hand skin tear.   Lymphatic/Nodes: No palpable lymphadenopathy  Musculoskeletal: All 4 extremities moving spontaneously, no limitations.   --------------------------------------------------------------------------------------------

## 2017-06-27 ENCOUNTER — APPOINTMENT (OUTPATIENT)
Dept: CARDIOLOGY | Facility: CLINIC | Age: 82
End: 2017-06-27

## 2017-06-27 DIAGNOSIS — S22.089A UNSPECIFIED FRACTURE OF T11-T12 VERTEBRA, INITIAL ENCOUNTER FOR CLOSED FRACTURE: ICD-10-CM
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
## 2017-06-27 DIAGNOSIS — I21.4 NON-ST ELEVATION (NSTEMI) MYOCARDIAL INFARCTION: ICD-10-CM

## 2017-06-27 DIAGNOSIS — R52 PAIN, UNSPECIFIED: ICD-10-CM

## 2017-06-27 DIAGNOSIS — D64.9 ANEMIA, UNSPECIFIED: ICD-10-CM
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
## 2017-06-27 DIAGNOSIS — Z95.0 PRESENCE OF CARDIAC PACEMAKER: ICD-10-CM

## 2017-06-27 DIAGNOSIS — Z51.5 ENCOUNTER FOR PALLIATIVE CARE: ICD-10-CM

## 2017-06-27 DIAGNOSIS — D50.0 IRON DEFICIENCY ANEMIA SECONDARY TO BLOOD LOSS (CHRONIC): ICD-10-CM

## 2017-06-27 DIAGNOSIS — I25.10 ATHEROSCLEROTIC HEART DISEASE OF NATIVE CORONARY ARTERY WITHOUT ANGINA PECTORIS: ICD-10-CM

## 2017-06-27 DIAGNOSIS — Z79.82 LONG TERM (CURRENT) USE OF ASPIRIN: ICD-10-CM

## 2017-06-27 DIAGNOSIS — C92.10 CHRONIC MYELOID LEUKEMIA, BCR/ABL-POSITIVE, NOT HAVING ACHIEVED REMISSION: ICD-10-CM

## 2017-06-27 DIAGNOSIS — R26.81 UNSTEADINESS ON FEET: ICD-10-CM

## 2017-06-27 DIAGNOSIS — S32.9XXA FRACTURE OF UNSPECIFIED PARTS OF LUMBOSACRAL SPINE AND PELVIS, INITIAL ENCOUNTER FOR CLOSED FRACTURE: ICD-10-CM

## 2017-06-27 DIAGNOSIS — G93.40 ENCEPHALOPATHY, UNSPECIFIED: ICD-10-CM

## 2017-06-27 DIAGNOSIS — R41.82 ALTERED MENTAL STATUS, UNSPECIFIED: ICD-10-CM

## 2017-06-27 DIAGNOSIS — R54 AGE-RELATED PHYSICAL DEBILITY: ICD-10-CM

## 2017-06-27 DIAGNOSIS — Z71.89 OTHER SPECIFIED COUNSELING: ICD-10-CM

## 2017-06-27 DIAGNOSIS — S22.39XA FRACTURE OF ONE RIB, UNSPECIFIED SIDE, INITIAL ENCOUNTER FOR CLOSED FRACTURE: ICD-10-CM

## 2017-06-27 DIAGNOSIS — N17.9 ACUTE KIDNEY FAILURE, UNSPECIFIED: ICD-10-CM

## 2017-06-27 PROCEDURE — 99497 ADVNCD CARE PLAN 30 MIN: CPT | Mod: 25

## 2017-06-27 PROCEDURE — 99223 1ST HOSP IP/OBS HIGH 75: CPT

## 2017-06-27 PROCEDURE — 71010: CPT | Mod: 26

## 2017-06-27 PROCEDURE — 99232 SBSQ HOSP IP/OBS MODERATE 35: CPT

## 2017-06-27 RX ORDER — IMATINIB MESYLATE 400 MG/1
100 TABLET, FILM COATED ORAL
Qty: 0 | Refills: 0 | Status: DISCONTINUED | OUTPATIENT
Start: 2017-06-27 | End: 2017-06-27

## 2017-06-27 RX ORDER — HYDROMORPHONE HYDROCHLORIDE 2 MG/ML
0.25 INJECTION INTRAMUSCULAR; INTRAVENOUS; SUBCUTANEOUS EVERY 6 HOURS
Qty: 0 | Refills: 0 | Status: DISCONTINUED | OUTPATIENT
Start: 2017-06-27 | End: 2017-06-27

## 2017-06-27 RX ORDER — HYDROMORPHONE HYDROCHLORIDE 2 MG/ML
0.2 INJECTION INTRAMUSCULAR; INTRAVENOUS; SUBCUTANEOUS EVERY 6 HOURS
Qty: 0 | Refills: 0 | Status: DISCONTINUED | OUTPATIENT
Start: 2017-06-27 | End: 2017-06-28

## 2017-06-27 RX ORDER — HYDROMORPHONE HYDROCHLORIDE 2 MG/ML
0.5 INJECTION INTRAMUSCULAR; INTRAVENOUS; SUBCUTANEOUS
Qty: 0 | Refills: 0 | Status: DISCONTINUED | OUTPATIENT
Start: 2017-06-27 | End: 2017-06-28

## 2017-06-27 RX ADMIN — Medication 650 MILLIGRAM(S): at 05:20

## 2017-06-27 RX ADMIN — Medication 650 MILLIGRAM(S): at 00:25

## 2017-06-27 RX ADMIN — LIDOCAINE 1 PATCH: 4 CREAM TOPICAL at 00:25

## 2017-06-27 RX ADMIN — LIDOCAINE 1 PATCH: 4 CREAM TOPICAL at 11:29

## 2017-06-27 RX ADMIN — LIDOCAINE 1 PATCH: 4 CREAM TOPICAL at 23:00

## 2017-06-27 RX ADMIN — HYDROMORPHONE HYDROCHLORIDE 0.2 MILLIGRAM(S): 2 INJECTION INTRAMUSCULAR; INTRAVENOUS; SUBCUTANEOUS at 17:45

## 2017-06-27 RX ADMIN — HYDROMORPHONE HYDROCHLORIDE 0.2 MILLIGRAM(S): 2 INJECTION INTRAMUSCULAR; INTRAVENOUS; SUBCUTANEOUS at 17:30

## 2017-06-27 RX ADMIN — HYDROMORPHONE HYDROCHLORIDE 0.5 MILLIGRAM(S): 2 INJECTION INTRAMUSCULAR; INTRAVENOUS; SUBCUTANEOUS at 16:16

## 2017-06-27 RX ADMIN — HYDROMORPHONE HYDROCHLORIDE 0.5 MILLIGRAM(S): 2 INJECTION INTRAMUSCULAR; INTRAVENOUS; SUBCUTANEOUS at 16:01

## 2017-06-27 RX ADMIN — Medication 650 MILLIGRAM(S): at 00:32

## 2017-06-27 NOTE — GOALS OF CARE CONVERSATION - PERSONAL ADVANCE DIRECTIVE - CONVERSATION DETAILS
GOC already addressed - Pt DNR.  Discussion and referral for hospice made.  Family agreed for hospice, evaluation for inpatient hospice; PCU unit

## 2017-06-27 NOTE — CONSULT NOTE ADULT - PROBLEM SELECTOR RECOMMENDATION 2
s/p hemorrhagic shock, multiple units of pRBC, clinically stable.  1. Limit blood draws.  2. Monitor RLE for swelling/neurovascular changes.  3. Hold aspirin.

## 2017-06-27 NOTE — CONSULT NOTE ADULT - ATTENDING COMMENTS
Patient seen and examined. I personally spent 30 minutes with advance care planning, discussing GOC as per above.

## 2017-06-27 NOTE — CONSULT NOTE ADULT - PROBLEM SELECTOR RECOMMENDATION 9
Gleevec held as not consistent with current goals of care.   CML also likely contributing to anemia.  S/p 2 PRBCs, 1 unit of FFP, 1 PLT.  Comfort care to be main goal.

## 2017-06-27 NOTE — CONSULT NOTE ADULT - PROBLEM SELECTOR RECOMMENDATION 3
On Hydromorphone 0.25mg IV Q3hrs PRN.  Tylenol 650mg Q6hrs standing.  Recommend to start standing medication Hydromorphone 0.2mg Q6hrs as patient likely unable to request for PRN medications.  0.5mg IV Q3hrs PRN.

## 2017-06-27 NOTE — CONSULT NOTE ADULT - PROBLEM SELECTOR RECOMMENDATION 4
OK to hold Gleevec if not consistent with current goals of care. CML also likely contributing to anemia.

## 2017-06-27 NOTE — CONSULT NOTE ADULT - PROBLEM SELECTOR RECOMMENDATION 5
Patient already DNR/DNI.  Family meeting with 2 sons and 1 daughter --> Breezy Sherman and Shelia.  Discussion taken place about Hospice.  Decision made to transfer patient to inpatient Hospice.  Referral Made  Referral for chaplaincy made - patient is Zoroastrian.

## 2017-06-27 NOTE — CONSULT NOTE ADULT - PROBLEM SELECTOR RECOMMENDATION 6
1. Pain control.  2. Limit anticholinergics.  3. Palliative care f/u.
Time spent 30 minutes discussing goals of care- discussed that goal is full comfort care, no further blood draws and titrate pain medications as needed. Agreeable to inpatient hospice.

## 2017-06-27 NOTE — CONSULT NOTE ADULT - SUBJECTIVE AND OBJECTIVE BOX
HPI:  89 year old Female with Medical history of CML, CAD, Macular degeneration, OA, Pacemaker from sick sinus sx. Patient was transferred from [facility] after multiple mechanical falls this week. +Head trauma with +LOC. GCS 13 on arrival. During primary survey pt's SBP was in the 60s-70s. Pt received 3 liters IVF, 2 units pRBCs, and 1 unit FFP. R IJ central line was placed for additional access. After blood pressure was stabilized primary survey was intact. Secondary survey notable for ecchymosis on b/l UE, skin tears on R hand and R knee, dried blood in  R ear, pelvic binder in place. Multiple fractures evidenced in XR survey. Evaluated by surgery who did not recommend any surgical treatment. Patient made DNR/DNI in the ED after family discussion.    Patient was seen and evaluated at bedside. Patient diaphoretic, tachypneic, grimaces when touched. Opens eyes occasionally, unable to speak coherently.    PERTINENT PM/SXH:   Sick sinus syndrome  Pacemaker  Osteoarthritis  Macular degeneration  CML (chronic myeloid leukemia)  Coronary artery disease  No pertinent past medical history    AICD (automatic cardioverter/defibrillator) present  No significant past surgical history    SOCIAL HISTORY:   Significant other/partner:  [ ] YES  [x] NO               Children:  [x] YES  [ ] NO                   Rastafarian/Spirituality: Anabaptist  Substance hx:  [ ] YES   [x] NO                   Tobacco hx:  [ ] YES  [x] NO                       Alcohol hx: [ ] YES  [x] NO         Home Opioid hx:  [ ] YES  [x] NO   Living Situation: [x] Home  [ ] Long term care  [ ] Rehab [ ] Other    FAMILY HISTORY:  No pertinent family history in first degree relatives    [x] Family history non-contributory     BASELINE (I)ADLs (prior to admission):  Ashley: [ ] total  [x] moderate [ ] dependent    ADVANCE DIRECTIVES:    DNR Yes    MOLST  [ ] YES [x] NO                      [ ] Completed  Health Care Proxy [ ] YES  [ ] NO   [ ] Completed  Living Will  [ ] YES [x] NO             [ ] Surrogate  [ ] HCP  [ ] Guardian: Decision maker Lane Ryannenancy                Phone#: [phone]    Allergies    No Known Allergies    Intolerances      MEDICATIONS  (STANDING):  lidocaine   Patch 1 Patch Transdermal daily  acetaminophen   Tablet. 650 milliGRAM(s) Oral every 6 hours  imatinib 100 milliGRAM(s) Oral three times a day with meals  HYDROmorphone  Injectable 0.2 milliGRAM(s) IV Push every 6 hours    MEDICATIONS  (PRN):  HYDROmorphone  Injectable 0.5 milliGRAM(s) IV Push every 3 hours PRN Moderate Pain (4 - 6)  bisacodyl Suppository 10 milliGRAM(s) Rectal daily PRN Constipation      PRESENT SYMPTOMS:  Source: [ ] Patient   [x] Family   [ ] Team     Pain:                        [ ] No [x] Yes             [ ] Mild [ ] Moderate [x] Severe    Onset - Weeks.  Location - Hip, back, arms.  Duration - unable to assess due to mental status.  Character - unable to assess due to mental status.  Alleviating/Aggravating - unable to assess due to mental status.  Radiation - unable to assess due to mental status.  Timing - unable to assess due to mental status.      Dyspnea:                [ ] No [x] Yes             [x] Mild [ ] Moderate [ ] Severe    Anxiety:                  [x] No [ ] Yes             [ ] Mild [ ] Moderate [ ] Severe    Fatigue:                  [x] No [ ] Yes             [ ] Mild [ ] Moderate [ ] Severe    Nausea:                  [x] No [ ] Yes             [ ] Mild [ ] Moderate [ ] Severe    Loss of appetite:   [x] No [ ] Yes             [ ] Mild [ ] Moderate [ ] Severe    Constipation:        [x] No [ ] Yes             [ ] Mild [ ] Moderate [ ] Severe    Other Symptoms:  [ ] All other review of systems negative   [x] Unable to obtain due to poor mentation     Karnofsky Performance Score/Palliative Performance Status Version 2: 10-20 %    PHYSICAL EXAM:  Vital Signs Last 24 Hrs  T(C): 36.8 (27 Jun 2017 14:10), Max: 36.8 (27 Jun 2017 05:18)  T(F): 98.2 (27 Jun 2017 14:10), Max: 98.3 (27 Jun 2017 05:18)  HR: 75 (27 Jun 2017 14:10) (71 - 78)  BP: 100/57 (27 Jun 2017 14:10) (96/64 - 131/79)  BP(mean): --  RR: 18 (27 Jun 2017 14:10) (18 - 20)  SpO2: 99% (27 Jun 2017 14:10) (92% - 99%) I&O's Summary    26 Jun 2017 07:01  -  27 Jun 2017 07:00  --------------------------------------------------------  IN: 950 mL / OUT: 670 mL / NET: 280 mL    27 Jun 2017 07:01  -  27 Jun 2017 15:40  --------------------------------------------------------  IN: 150 mL / OUT: 200 mL / NET: -50 mL        General:  [ ] Alert  [ ] Oriented x      [x] Lethargic  [ ] Agitated   [ ] Cachexia   [ ] Unarousable  [ ] Verbal  [ ] Non-Verbal    HEENT:  [ ] Normal   [x] Dry mouth   [ ] ET Tube    [ ] Trach  [ ] Oral lesions    Lungs:   [x] Clear [ ] Tachypnea  [ ] Audible excessive secretions   [ ] Rhonchi        [ ] Right [ ] Left [ ] Bilateral  [ ] Crackles        [ ] Right [ ] Left [ ] Bilateral  [ ] Wheezing     [ ] Right [ ] Left [ ] Bilateral    Cardiovascular:  [x] Regular [ ] Irregular [ ] Tachycardia   [ ] Bradycardia  [ ] Murmur [ ] Other    Abdomen: [x] Soft  [ ] Distended   [ ] +BS  [ ] Non tender [ ] Tender  [ ]PEG   [ ]OGT/ NGT   Last BM:       Genitourinary: [ ] Normal [ ] Incontinent   [ ] Oliguria/Anuria   [x] Ugerra    Musculoskeletal:  [ ] Normal   [ ] Weakness  [ ] Bedbound/Wheelchair bound [x] Edema    Neurological: [ ] No focal deficits  [x] Cognitive impairment  [ ] Dysphagia [ ] Dysarthria [ ] Paresis [ ] Other     Skin: [ ] Normal   [ ] Pressure ulcer(s)                  [ ] Rash --> multiple skin lacerations and ecchymosis     LABS:                        9.4    9.6   )-----------( 145      ( 26 Jun 2017 07:00 )             27.9     06-26    142  |  110<H>  |  47<H>  ----------------------------<  135<H>  3.7   |  23  |  1.62<H>    Ca    7.7<L>      26 Jun 2017 03:03    TPro  5.7<L>  /  Alb  3.0<L>  /  TBili  1.0  /  DBili  x   /  AST  41<H>  /  ALT  39  /  AlkPhos  95  06-26    PT/INR - ( 26 Jun 2017 03:03 )   PT: 14.3 sec;   INR: 1.32 ratio         PTT - ( 26 Jun 2017 03:03 )  PTT:31.3 sec    Oral Intake: [x] Unable/mouth care only [ ] Minimal [ ] Moderate [ ] Full Capability  Diet: [x] NPO [ ] Tube feeds [ ] TPN [ ] Other     RADIOLOGY & ADDITIONAL STUDIES: < from: CT Abdomen and Pelvis w/ IV Cont (06.26.17 @ 03:07) >  Acute nondisplaced fractures of the left posterior 8th and 9th ribs.   Right iliac fractures, unchanged.. Age indeterminant compression fracture   deformities of T1, T12, L2, and L3. < from: CT Abdomen and Pelvis w/ IV Cont (06.26.17 @ 03:07) >  Right flank hematoma with surrounding edema versus additional foci of   hemorrhage.    REFERRALS:   [x] Chaplaincy  [x] Hospice  [ ] Child Life  [ ] Social Work  [ ] Case management [ ] Holistic Therapy
Patient is a poor historian, majority of history obtained from chart.    Chief Complaint: Right hip pain    HPI: Ms. Arias is a 89 year old female who presented to our ER after transfer from [facility] after multiple falls at home this week. Head trauma and loss of consciousness was noted, as well as hypotension on arrival. Pt received 3 liters IVF, 2 units pRBCs, and 1 unit FFP. Her blood pressure responded thereafter, and she has now been transferred to the regular floor from the SICU.    At present, Ms. Arias is now awake and alert, answering most questions. She reports right sided hip pain upon movement or palpation, as well as diffuse generalized pain. Denies SOB. Denies chest pain. Denies palpitations. Denies fever/chills. Denies nausea/vomiting.      PAST MEDICAL & SURGICAL HISTORY:  Sick sinus syndrome  Pacemaker  Osteoarthritis  Macular degeneration  CML (chronic myeloid leukemia)  Coronary artery disease  AICD (automatic cardioverter/defibrillator) present      Review of Systems:   CONSTITUTIONAL: No fever.  EYES: No eye pain.  ENMT:  No throat pain.  NECK: + neck pain  RESPIRATORY: No cough, wheezing, chills or hemoptysis; No shortness of breath  CARDIOVASCULAR: No chest pain, palpitations, dizziness. + leg swelling.  GASTROINTESTINAL: No abdominal or epigastric pain. No nausea, vomiting, or hematemesis; No diarrhea or constipation. No melena or hematochezia.  GENITOURINARY: No dysuria or incontinence. Guerra in place.  NEUROLOGICAL: No headaches.  SKIN: No rashes.  LYMPH NODES: No enlarged glands  MUSCULOSKELETAL: See HPI.  PSYCHIATRIC: No difficulty sleeping.  HEME/LYMPH: No easy bruising, or bleeding gums  ALLERY AND IMMUNOLOGIC: No hives or eczema    Allergies: No Known Allergies    Intolerances: Denies    Social History: [place] immigrant. States she lives with her son. Denies smoking history.    FAMILY HISTORY:  No pertinent family history in first degree relatives      MEDICATIONS  (STANDING):  lidocaine   Patch 1 Patch Transdermal daily  acetaminophen   Tablet. 650 milliGRAM(s) Oral every 6 hours  imatinib 100 milliGRAM(s) Oral three times a day with meals  HYDROmorphone  Injectable 0.25 milliGRAM(s) IV Push every 6 hours    MEDICATIONS  (PRN):  HYDROmorphone  Injectable 0.5 milliGRAM(s) IV Push every 3 hours PRN Moderate Pain (4 - 6)  bisacodyl Suppository 10 milliGRAM(s) Rectal daily PRN Constipation      Vital Signs Last 24 Hrs  T(C): 36.4 (06-27-17 @ 10:12), Max: 36.8 (06-27-17 @ 05:18)  HR: 76 (06-27-17 @ 10:12) (71 - 78)  BP: 131/79 (06-27-17 @ 10:12) (96/64 - 131/79)  RR: 18 (06-27-17 @ 10:12) (18 - 20)  SpO2: 99% (06-27-17 @ 10:12) (92% - 99%)  CAPILLARY BLOOD GLUCOSE        I&O's Summary    26 Jun 2017 07:01  -  27 Jun 2017 07:00  --------------------------------------------------------  IN: 950 mL / OUT: 670 mL / NET: 280 mL        PHYSICAL EXAM:  GENERAL: NAD at rest, frail appearing.  HEAD:  Atraumatic, Normocephalic  EYES: EOMI, PERRLA, conjunctiva and sclera clear  NECK: Supple, No JVD  CHEST/LUNG: Bibasilar rales. No wheeze  HEART: Regular rate and rhythm; No murmurs, rubs, or gallops  ABDOMEN: Soft, Nontender, Nondistended; Bowel sounds present. Guerra.  EXTREMITIES:  Bilateral LE edema. RLE in compression bandages, pain on palpation.  PSYCH: AAO to person, place (hospital), not date/time.  NEUROLOGY: Generalized weakness. Withdraws to painful stimuli.  SKIN: No rashes or lesions    LABS:                        9.4    9.6   )-----------( 145      ( 26 Jun 2017 07:00 )             27.9     06-26    142  |  110<H>  |  47<H>  ----------------------------<  135<H>  3.7   |  23  |  1.62<H>    Ca    7.7<L>      26 Jun 2017 03:03    TPro  5.7<L>  /  Alb  3.0<L>  /  TBili  1.0  /  DBili  x   /  AST  41<H>  /  ALT  39  /  AlkPhos  95  06-26    PT/INR - ( 26 Jun 2017 03:03 )   PT: 14.3 sec;   INR: 1.32 ratio         PTT - ( 26 Jun 2017 03:03 )  PTT:31.3 sec  CARDIAC MARKERS ( 26 Jun 2017 03:03 )  x     / 0.06 ng/mL / 105 U/L / x     / x          RADIOLOGY & ADDITIONAL TESTS:    Imaging Personally Reviewed:     CT C-Spine:   Moderate compression fracture of T1 as seen on the prior exam. Mild   multilevel loss of height of the cervical vertebral bodies likely due to   degenerative disease. No evidence of traumatic malalignment.      CT Chest/Abdomen/Pelvis:   Acute nondisplaced fractures of the left posterior 8th and 9th ribs.   Right iliac fractures, unchanged.. Age indeterminant compression fracture   deformities of T1, T12, L2, and L3.    Right flank hematoma with surrounding edema versus additional foci of   hemorrhage.    Diffuse peripheral reticular opacities, bibasilar interlobular septal   wall thickening, and bibasilar groundglass opacities may represent   idiopathic pulmonary fibrosis.    Small bilateral pleural effusions    CT Head:   No acute intracranial hemorrhage, mass effect, or acute osseous fracture.   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    EKG Personally Reviewed: Not performed    Consultant(s) Notes Reviewed:  Surgery    Care Discussed with Consultants/Other Providers: Surgery - Dr Diamond, Palliative Care - Dr. Mendoza
HISTORY OF PRESENT ILLNESS:  89F s/p multiple mechanical falls this week. Reportedly +head strike w/ LOC. GCS 13 on arrival. Primary survey significant for SBP 70s. Pt received 3 liters IVF, 2 units pRBCs, 1 unit FFP, 1 platelets and ddAVP in ED. Her blood pressure responded and stabilized. A R IJ central line was placed for additional access. After blood pressure was stabilized primary survey was intact. Secondary survey notable for ecchymosis on b/l UE, skin tears on R hand and R knee, dried blood in R ear w/ possible mass. Pelvic binder applied given iliac wing fracture. Patient was made DNR per discussion between ED attending and patient's son.    PAST MEDICAL HISTORY: Sick sinus syndrome  Pacemaker  Osteoarthritis  Macular degeneration  CML (chronic myeloid leukemia)  Coronary artery disease  No pertinent past medical history      PAST SURGICAL HISTORY: AICD (automatic cardioverter/defibrillator)      FAMILY HISTORY: No pertinent family history in first degree relatives      SOCIAL HISTORY: Unknown    CODE STATUS: DNR    ALLERGIES: No Known Allergies      VITAL SIGNS:  ICU Vital Signs Last 24 Hrs  T(C): 36.9, Max: 36.9 (06-26 @ 05:15)  T(F): 98.4, Max: 98.4 (06-26 @ 05:15)  HR: 87 (82 - 89)  BP: 134/83 (75/58 - 134/83)  BP(mean): 102 (100 - 102)  RR: 18 (18 - 26)  SpO2: 99% (18% - 100%)      NEURO  Exam: awake, following commands, mumbled speech  HYDROmorphone  Injectable 0.25milliGRAM(s) IV Push every 3 hours PRN Severe Pain (7 - 10)  acetaminophen  IVPB. 1000milliGRAM(s) IV Intermittent once      RESPIRATORY  ABG - ( 26 Jun 2017 06:59 )  pH: 7.27  /  pCO2: 45    /  pO2: 296   / HCO3: 20    / Base Excess: -6.3  /  SaO2: 100     Lactate: x        Exam: On facemask, non-labored      CARDIOVASCULAR    Exam: RRR  Cardiac Rhythm: sinus w/ intermittent pacing    GI/NUTRITION  Exam: soft, NTND  Diet: NPO      GENITOURINARY/RENAL  dextrose 5% + sodium chloride 0.45% with potassium chloride 20 mEq/L 1000milliLiter(s) IV Continuous <Continuous>  potassium chloride  10 mEq/100 mL IVPB 10milliEquivalent(s) IV Intermittent every 1 hour      I & Os for current day (as of 06-26 @ 07:20)  =============================================  IN:    Total IN: 0 ml  ---------------------------------------------  OUT:    Indwelling Catheter - Urethral: 300 ml    Total OUT: 300 ml  ---------------------------------------------  Total NET: -300 ml    Weight (kg): 61.5 (06-26 @ 05:15)  06-26    142  |  110<H>  |  47<H>  ----------------------------<  135<H>  3.7   |  23  |  1.62<H>    Ca    7.7<L>      26 Jun 2017 03:03    TPro  5.7<L>  /  Alb  3.0<L>  /  TBili  1.0  /  DBili  x   /  AST  41<H>  /  ALT  39  /  AlkPhos  95  06-26    [X] Guerra catheter, indication: urine output monitoring in critically ill patient    HEMATOLOGIC  [ ] VTE Prophylaxis: SCDs, chemoppx held for concerns of bleeding                          9.8    10.9  )-----------( 103      ( 26 Jun 2017 03:03 )             29.5     PT/INR - ( 26 Jun 2017 03:03 )   PT: 14.3 sec;   INR: 1.32 ratio         PTT - ( 26 Jun 2017 03:03 )  PTT:31.3 sec  Transfusion: [x] 2 PRBC	[x] 1 Platelets	[x] 1 FFP	[ ] Cryoprecipitate      INFECTIOUS DISEASES    RECENT CULTURES: None      ENDOCRINE    CAPILLARY BLOOD GLUCOSE      PATIENT CARE ACCESS DEVICES:  [ ] Peripheral IV  [X] Central Venous Line	[X] R	[ ] L	[X] IJ	[ ] Fem	[ ] SC	Placed: 6/26  [ ] Arterial Line		[ ] R	[ ] L	[ ] Fem	[ ] Rad	[ ] Ax	Placed:   [ ] PICC:					[ ] Mediport  [ ] Urinary Catheter, Date Placed:   [x] Necessity of urinary, arterial, and venous catheters discussed    OTHER MEDICATIONS:     IMAGING STUDIES:

## 2017-06-27 NOTE — CONSULT NOTE ADULT - PROBLEM SELECTOR RECOMMENDATION 9
Plan of care discussed with palliative care team. Primary goal is comfort. Plan to transition to standing dilaudid infusions for basal pain control.  1. C/w lidoderm patch, tylenol as tolerated.  2. Incentive spirometry as tolerated.  3. O2 support as needed.  4. Plan for possible PCU transfer for further symptom control.

## 2017-06-27 NOTE — CONSULT NOTE ADULT - PROBLEM SELECTOR RECOMMENDATION 7
elevated BUN/creatinine on admission. Suspect ATN 2/2 hypotension, likely worsening after contrast administration.  1. Maintain Guerra for comfort.  2. Trend BMP if consistent with goals of care.  3. Monitor Is and Os.  4. Renal dosing of medications.

## 2017-06-27 NOTE — CONSULT NOTE ADULT - ASSESSMENT
89 year old Female with Medical history of CML, CAD, Macular degeneration, OA, Pacemaker from sick sinus sx. Patient was transferred from [facility] after multiple mechanical falls this week. Multiple fractures evidenced in XR survey. Evaluated by surgery who did not recommend any surgical treatment. Patient made DNR/DNI in the ED after family discussion.

## 2017-06-28 ENCOUNTER — INPATIENT (INPATIENT)
Facility: HOSPITAL | Age: 82
LOS: 0 days | DRG: 840 | End: 2017-06-28
Attending: INTERNAL MEDICINE | Admitting: INTERNAL MEDICINE
Payer: OTHER MISCELLANEOUS

## 2017-06-28 VITALS
TEMPERATURE: 99 F | DIASTOLIC BLOOD PRESSURE: 60 MMHG | OXYGEN SATURATION: 100 % | HEART RATE: 78 BPM | SYSTOLIC BLOOD PRESSURE: 110 MMHG | RESPIRATION RATE: 18 BRPM

## 2017-06-28 VITALS — RESPIRATION RATE: 24 BRPM | OXYGEN SATURATION: 98 %

## 2017-06-28 DIAGNOSIS — C92.10 CHRONIC MYELOID LEUKEMIA, BCR/ABL-POSITIVE, NOT HAVING ACHIEVED REMISSION: ICD-10-CM

## 2017-06-28 DIAGNOSIS — T14.90 INJURY, UNSPECIFIED: ICD-10-CM

## 2017-06-28 DIAGNOSIS — Z95.810 PRESENCE OF AUTOMATIC (IMPLANTABLE) CARDIAC DEFIBRILLATOR: Chronic | ICD-10-CM
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
## 2017-06-28 DIAGNOSIS — R52 PAIN, UNSPECIFIED: ICD-10-CM

## 2017-06-28 DIAGNOSIS — S32.9XXS: ICD-10-CM

## 2017-06-28 PROCEDURE — 83605 ASSAY OF LACTIC ACID: CPT

## 2017-06-28 PROCEDURE — 86901 BLOOD TYPING SEROLOGIC RH(D): CPT

## 2017-06-28 PROCEDURE — 84132 ASSAY OF SERUM POTASSIUM: CPT

## 2017-06-28 PROCEDURE — 85610 PROTHROMBIN TIME: CPT

## 2017-06-28 PROCEDURE — 82550 ASSAY OF CK (CPK): CPT

## 2017-06-28 PROCEDURE — 71045 X-RAY EXAM CHEST 1 VIEW: CPT

## 2017-06-28 PROCEDURE — 99291 CRITICAL CARE FIRST HOUR: CPT | Mod: 25

## 2017-06-28 PROCEDURE — 86900 BLOOD TYPING SEROLOGIC ABO: CPT

## 2017-06-28 PROCEDURE — 72125 CT NECK SPINE W/O DYE: CPT

## 2017-06-28 PROCEDURE — 36430 TRANSFUSION BLD/BLD COMPNT: CPT

## 2017-06-28 PROCEDURE — 99233 SBSQ HOSP IP/OBS HIGH 50: CPT

## 2017-06-28 PROCEDURE — 71260 CT THORAX DX C+: CPT

## 2017-06-28 PROCEDURE — 82330 ASSAY OF CALCIUM: CPT

## 2017-06-28 PROCEDURE — 83880 ASSAY OF NATRIURETIC PEPTIDE: CPT

## 2017-06-28 PROCEDURE — 82803 BLOOD GASES ANY COMBINATION: CPT

## 2017-06-28 PROCEDURE — 74177 CT ABD & PELVIS W/CONTRAST: CPT

## 2017-06-28 PROCEDURE — 84295 ASSAY OF SERUM SODIUM: CPT

## 2017-06-28 PROCEDURE — P9037: CPT

## 2017-06-28 PROCEDURE — 85027 COMPLETE CBC AUTOMATED: CPT

## 2017-06-28 PROCEDURE — 85730 THROMBOPLASTIN TIME PARTIAL: CPT

## 2017-06-28 PROCEDURE — 96374 THER/PROPH/DIAG INJ IV PUSH: CPT | Mod: XU

## 2017-06-28 PROCEDURE — 82435 ASSAY OF BLOOD CHLORIDE: CPT

## 2017-06-28 PROCEDURE — 80053 COMPREHEN METABOLIC PANEL: CPT

## 2017-06-28 PROCEDURE — 70450 CT HEAD/BRAIN W/O DYE: CPT

## 2017-06-28 PROCEDURE — 86850 RBC ANTIBODY SCREEN: CPT

## 2017-06-28 PROCEDURE — 85014 HEMATOCRIT: CPT

## 2017-06-28 PROCEDURE — 82947 ASSAY GLUCOSE BLOOD QUANT: CPT

## 2017-06-28 PROCEDURE — 84484 ASSAY OF TROPONIN QUANT: CPT

## 2017-06-28 PROCEDURE — 99233 SBSQ HOSP IP/OBS HIGH 50: CPT | Mod: GC

## 2017-06-28 PROCEDURE — 96375 TX/PRO/DX INJ NEW DRUG ADDON: CPT

## 2017-06-28 RX ORDER — LIDOCAINE 4 G/100G
1 CREAM TOPICAL
Qty: 0 | Refills: 0 | COMMUNITY
Start: 2017-06-28

## 2017-06-28 RX ORDER — HYDROMORPHONE HYDROCHLORIDE 2 MG/ML
0.5 INJECTION INTRAMUSCULAR; INTRAVENOUS; SUBCUTANEOUS
Qty: 0 | Refills: 0 | Status: DISCONTINUED | OUTPATIENT
Start: 2017-06-28 | End: 2017-06-28

## 2017-06-28 RX ORDER — ACETAMINOPHEN 500 MG
650 TABLET ORAL EVERY 6 HOURS
Qty: 0 | Refills: 0 | Status: DISCONTINUED | OUTPATIENT
Start: 2017-06-28 | End: 2017-06-28

## 2017-06-28 RX ORDER — SODIUM CHLORIDE 9 MG/ML
1000 INJECTION INTRAMUSCULAR; INTRAVENOUS; SUBCUTANEOUS
Qty: 0 | Refills: 0 | Status: DISCONTINUED | OUTPATIENT
Start: 2017-06-28 | End: 2017-06-28

## 2017-06-28 RX ORDER — ACETAMINOPHEN 500 MG
2 TABLET ORAL
Qty: 0 | Refills: 0 | COMMUNITY
Start: 2017-06-28

## 2017-06-28 RX ORDER — HYDROMORPHONE HYDROCHLORIDE 2 MG/ML
0.2 INJECTION INTRAMUSCULAR; INTRAVENOUS; SUBCUTANEOUS
Qty: 0 | Refills: 0 | COMMUNITY
Start: 2017-06-28

## 2017-06-28 RX ORDER — ROBINUL 0.2 MG/ML
0.4 INJECTION INTRAMUSCULAR; INTRAVENOUS EVERY 6 HOURS
Qty: 0 | Refills: 0 | Status: DISCONTINUED | OUTPATIENT
Start: 2017-06-28 | End: 2017-06-28

## 2017-06-28 RX ORDER — HYDROMORPHONE HYDROCHLORIDE 2 MG/ML
0.2 INJECTION INTRAMUSCULAR; INTRAVENOUS; SUBCUTANEOUS
Qty: 100 | Refills: 0 | Status: DISCONTINUED | OUTPATIENT
Start: 2017-06-28 | End: 2017-06-28

## 2017-06-28 RX ADMIN — HYDROMORPHONE HYDROCHLORIDE 0.2 MILLIGRAM(S): 2 INJECTION INTRAMUSCULAR; INTRAVENOUS; SUBCUTANEOUS at 11:41

## 2017-06-28 RX ADMIN — LIDOCAINE 1 PATCH: 4 CREAM TOPICAL at 11:42

## 2017-06-28 RX ADMIN — HYDROMORPHONE HYDROCHLORIDE 0.2 MILLIGRAM(S): 2 INJECTION INTRAMUSCULAR; INTRAVENOUS; SUBCUTANEOUS at 06:54

## 2017-06-28 RX ADMIN — HYDROMORPHONE HYDROCHLORIDE 0.2 MILLIGRAM(S): 2 INJECTION INTRAMUSCULAR; INTRAVENOUS; SUBCUTANEOUS at 12:00

## 2017-06-28 RX ADMIN — HYDROMORPHONE HYDROCHLORIDE 0.2 MILLIGRAM(S): 2 INJECTION INTRAMUSCULAR; INTRAVENOUS; SUBCUTANEOUS at 02:13

## 2017-06-28 RX ADMIN — HYDROMORPHONE HYDROCHLORIDE 0.2 MG/HR: 2 INJECTION INTRAMUSCULAR; INTRAVENOUS; SUBCUTANEOUS at 14:15

## 2017-06-28 RX ADMIN — HYDROMORPHONE HYDROCHLORIDE 0.2 MG/HR: 2 INJECTION INTRAMUSCULAR; INTRAVENOUS; SUBCUTANEOUS at 19:23

## 2017-06-28 RX ADMIN — SODIUM CHLORIDE 10 MILLILITER(S): 9 INJECTION INTRAMUSCULAR; INTRAVENOUS; SUBCUTANEOUS at 14:15

## 2017-06-28 RX ADMIN — HYDROMORPHONE HYDROCHLORIDE 0.2 MILLIGRAM(S): 2 INJECTION INTRAMUSCULAR; INTRAVENOUS; SUBCUTANEOUS at 07:09

## 2017-06-28 RX ADMIN — HYDROMORPHONE HYDROCHLORIDE 0.2 MILLIGRAM(S): 2 INJECTION INTRAMUSCULAR; INTRAVENOUS; SUBCUTANEOUS at 01:43

## 2017-06-28 NOTE — H&P ADULT - HISTORY OF PRESENT ILLNESS
89 year old Female with Medical history of CML, CAD, Macular degeneration, OA, Pacemaker from sick sinus sx. Patient was transferred from [facility] after multiple mechanical falls this week. Multiple fractures evidenced in XR survey. Evaluated by surgery who did not recommend any surgical treatment. Patient made DNR/DNI in the ED after family discussion. Pt brought to PCU, with Hospice benefit, for continued care and symptom management.

## 2017-06-28 NOTE — DISCHARGE NOTE ADULT - CARE PROVIDER_API CALL
Lizandro Diamond), Surgery; Surgical Critical Care  [id number] [address] [place] [id number]  Phone: [phone]  Fax: [phone]

## 2017-06-28 NOTE — PROVIDER CONTACT NOTE (OTHER) - ASSESSMENT
No response to external stimuli  No spontaneous respirations  No apical heart rate  Negative papillary response to light

## 2017-06-28 NOTE — PROGRESS NOTE ADULT - SUBJECTIVE AND OBJECTIVE BOX
Patient evaluated in AM round.  Hemodynamically improved after aggressive hydration and blood/product transfusion  Extensive discussion with family, her daughter and other family members do not want aggressive measure of resuscitation. The want to keep her comfortable considering her age and severe underlying comorbidities.  Will stop blood drawing  Good pain control  Decrease IVF rate  Palliative care called for advance illness disposition
GENERAL SURGERY DAILY PROGRESS NOTE:       Subjective:  appears comfortable, unable to answer questions      Objective:  lying comfortably in bed    MEDICATIONS  (STANDING):  lidocaine   Patch 1 Patch Transdermal daily  acetaminophen   Tablet. 650 milliGRAM(s) Oral every 6 hours  HYDROmorphone  Injectable 0.2 milliGRAM(s) IV Push every 6 hours    MEDICATIONS  (PRN):  HYDROmorphone  Injectable 0.5 milliGRAM(s) IV Push every 3 hours PRN Moderate Pain (4 - 6)  bisacodyl Suppository 10 milliGRAM(s) Rectal daily PRN Constipation      Vital Signs Last 24 Hrs  T(C): 37 (28 Jun 2017 04:49), Max: 37.3 (27 Jun 2017 17:16)  T(F): 98.6 (28 Jun 2017 04:49), Max: 99.1 (27 Jun 2017 17:16)  HR: 78 (28 Jun 2017 04:49) (75 - 78)  BP: 110/60 (28 Jun 2017 04:49) (100/57 - 131/79)  BP(mean): --  RR: 18 (28 Jun 2017 04:49) (17 - 18)  SpO2: 100% (28 Jun 2017 04:49) (98% - 100%)    I&O's Detail    27 Jun 2017 07:01  -  28 Jun 2017 07:00  --------------------------------------------------------  IN:    dextrose 5% + sodium chloride 0.45% with potassium chloride 20 mEq/L: 150 mL  Total IN: 150 mL    OUT:    Indwelling Catheter - Urethral: 350 mL  Total OUT: 350 mL    Total NET: -200 mL          Daily     Daily     LABS:
GENERAL SURGERY FLOOR TRANSFER NOTE    89y Female transferred to floor from SICU    SUBJECTIVE: Pt unable to report subjective complains. Responds to verbal commands with moans. Pt is supportive care. Advanced illness team aware of patient      OBJECTIVE:    T(C): 36.6, Max: 36.9 (06-26 @ 05:15)  HR: 76 (76 - 89)  BP: 117/53 (75/58 - 134/83)  RR: 33 (15 - 33)  SpO2: 98% (18% - 100%)  Wt(kg): --      I&O's Summary  I & Os for 24h ending 26 Jun 2017 07:00  =============================================  IN: 125 ml / OUT: 345 ml / NET: -220 ml    I & Os for current day (as of 26 Jun 2017 15:39)  =============================================  IN: 350 ml / OUT: 195 ml / NET: 155 ml                            9.4    9.6   )-----------( 145      ( 26 Jun 2017 07:00 )             27.9       06-26    142  |  110<H>  |  47<H>  ----------------------------<  135<H>  3.7   |  23  |  1.62<H>    Ca    7.7<L>      26 Jun 2017 03:03    TPro  5.7<L>  /  Alb  3.0<L>  /  TBili  1.0  /  DBili  x   /  AST  41<H>  /  ALT  39  /  AlkPhos  95  06-26      MEDICATIONS  (STANDING):  dextrose 5% + sodium chloride 0.45% with potassium chloride 20 mEq/L 1000milliLiter(s) IV Continuous <Continuous>  lidocaine   Patch 1Patch Transdermal daily  acetaminophen   Tablet. 650milliGRAM(s) Oral every 6 hours    MEDICATIONS  (PRN):  HYDROmorphone  Injectable 0.25milliGRAM(s) IV Push every 3 hours PRN Severe Pain (7 - 10)        PHYSICAL EXAM:  Gen: awake, NAD  Abd: soft, ND, ND. no rebound/guarding.   Pelvis: Pelvic binder removed in SICU.
GENERAL SURGERY FLOOR TRANSFER NOTE    89y Female transferred to floor from SICU    SUBJECTIVE: Pt unable to report subjective complains. Responds to verbal commands with moans. Pt is supportive care. Advanced illness team aware of patient      OBJECTIVE:    T(C): 36.6, Max: 36.9 (06-26 @ 05:15)  HR: 76 (76 - 89)  BP: 117/53 (75/58 - 134/83)  RR: 33 (15 - 33)  SpO2: 98% (18% - 100%)  Wt(kg): --      I&O's Summary  I & Os for 24h ending 26 Jun 2017 07:00  =============================================  IN: 125 ml / OUT: 345 ml / NET: -220 ml    I & Os for current day (as of 26 Jun 2017 15:39)  =============================================  IN: 350 ml / OUT: 195 ml / NET: 155 ml                            9.4    9.6   )-----------( 145      ( 26 Jun 2017 07:00 )             27.9       06-26    142  |  110<H>  |  47<H>  ----------------------------<  135<H>  3.7   |  23  |  1.62<H>    Ca    7.7<L>      26 Jun 2017 03:03    TPro  5.7<L>  /  Alb  3.0<L>  /  TBili  1.0  /  DBili  x   /  AST  41<H>  /  ALT  39  /  AlkPhos  95  06-26      MEDICATIONS  (STANDING):  dextrose 5% + sodium chloride 0.45% with potassium chloride 20 mEq/L 1000milliLiter(s) IV Continuous <Continuous>  lidocaine   Patch 1Patch Transdermal daily  acetaminophen   Tablet. 650milliGRAM(s) Oral every 6 hours    MEDICATIONS  (PRN):  HYDROmorphone  Injectable 0.25milliGRAM(s) IV Push every 3 hours PRN Severe Pain (7 - 10)        PHYSICAL EXAM:  Gen: awake, NAD  Abd: soft, ND, ND. no rebound/guarding.   Pelvis: Pelvic binder removed in SICU.
Patient is a 89y old  Female who presents with a chief complaint of s/p fall (28 Jun 2017 09:06)      SUBJECTIVE / OVERNIGHT EVENTS: Feels OK. Pain is controlled. Has pain in left hip when it is moved. Denies fever/chills. No nausea/vomiting.    MEDICATIONS  (STANDING):  lidocaine   Patch 1 Patch Transdermal daily  acetaminophen   Tablet. 650 milliGRAM(s) Oral every 6 hours  HYDROmorphone  Injectable 0.2 milliGRAM(s) IV Push every 6 hours    MEDICATIONS  (PRN):  HYDROmorphone  Injectable 0.5 milliGRAM(s) IV Push every 3 hours PRN Moderate Pain (4 - 6)  bisacodyl Suppository 10 milliGRAM(s) Rectal daily PRN Constipation      Vital Signs Last 24 Hrs  T(C): 37 (06-28-17 @ 04:49), Max: 37.3 (06-27-17 @ 17:16)  HR: 78 (06-28-17 @ 04:49) (75 - 78)  BP: 110/60 (06-28-17 @ 04:49) (100/57 - 118/47)  RR: 18 (06-28-17 @ 04:49) (17 - 18)  SpO2: 100% (06-28-17 @ 04:49) (98% - 100%)  CAPILLARY BLOOD GLUCOSE        I&O's Summary    27 Jun 2017 07:01  -  28 Jun 2017 07:00  --------------------------------------------------------  IN: 150 mL / OUT: 350 mL / NET: -200 mL    PHYSICAL EXAM:  GENERAL: NAD at rest, frail appearing.  HEAD:  Atraumatic, Normocephalic  EYES: EOMI, PERRLA, conjunctiva and sclera clear  NECK: Supple, No JVD  CHEST/LUNG: Bibasilar rales. No wheeze  HEART: Regular rate and rhythm; No murmurs, rubs, or gallops  ABDOMEN: Soft, Nontender, Nondistended; Bowel sounds present. Guerra.  EXTREMITIES:  Bilateral LE edema. RLE in compression bandages, pain on palpation.  PSYCH: AAO to person, place (hospital), not date/time.  NEUROLOGY: Generalized weakness. Withdraws to painful stimuli.  SKIN: No rashes or lesions    LABS: No new studies      RADIOLOGY & ADDITIONAL TESTS:    Imaging Personally Reviewed: No new studies    Consultant(s) Notes Reviewed:  Surgery, Palliative Care    Care Discussed with Consultants/Other Providers: Surgery - Dr. Diamond
TRAUMAGENERAL SURGERY DAILY PROGRESS NOTE:       Subjective:  Pt laying in bed, alert. Pt states she has hip pain otherwise. Pt has no chest pain, no shortness of breathe, no palpitations, no abdominal pain.     Objective:      MEDICATIONS  (STANDING):  lidocaine   Patch 1 Patch Transdermal daily  acetaminophen   Tablet. 650 milliGRAM(s) Oral every 6 hours  imatinib 100 milliGRAM(s) Oral three times a day with meals  HYDROmorphone  Injectable 0.2 milliGRAM(s) IV Push every 6 hours    MEDICATIONS  (PRN):  HYDROmorphone  Injectable 0.5 milliGRAM(s) IV Push every 3 hours PRN Moderate Pain (4 - 6)  bisacodyl Suppository 10 milliGRAM(s) Rectal daily PRN Constipation      Vital Signs Last 24 Hrs  T(C): 36.8 (27 Jun 2017 14:10), Max: 36.8 (27 Jun 2017 05:18)  T(F): 98.2 (27 Jun 2017 14:10), Max: 98.3 (27 Jun 2017 05:18)  HR: 75 (27 Jun 2017 14:10) (71 - 76)  BP: 100/57 (27 Jun 2017 14:10) (97/61 - 131/79)  BP(mean): --  RR: 18 (27 Jun 2017 14:10) (18 - 20)  SpO2: 99% (27 Jun 2017 14:10) (92% - 99%)    I&O's Detail    26 Jun 2017 07:01  -  27 Jun 2017 07:00  --------------------------------------------------------  IN:    dextrose 5% + sodium chloride 0.45% with potassium chloride 20 mEq/L: 950 mL  Total IN: 950 mL    OUT:    Indwelling Catheter - Urethral: 670 mL  Total OUT: 670 mL    Total NET: 280 mL      27 Jun 2017 07:01  -  27 Jun 2017 15:56  --------------------------------------------------------  IN:    dextrose 5% + sodium chloride 0.45% with potassium chloride 20 mEq/L: 150 mL  Total IN: 150 mL    OUT:    Indwelling Catheter - Urethral: 200 mL  Total OUT: 200 mL    Total NET: -50 mL          Daily     Daily     LABS:                        9.4    9.6   )-----------( 145      ( 26 Jun 2017 07:00 )             27.9     06-26    142  |  110<H>  |  47<H>  ----------------------------<  135<H>  3.7   |  23  |  1.62<H>    Ca    7.7<L>      26 Jun 2017 03:03    TPro  5.7<L>  /  Alb  3.0<L>  /  TBili  1.0  /  DBili  x   /  AST  41<H>  /  ALT  39  /  AlkPhos  95  06-26    PT/INR - ( 26 Jun 2017 03:03 )   PT: 14.3 sec;   INR: 1.32 ratio         PTT - ( 26 Jun 2017 03:03 )  PTT:31.3 sec
INTERVAL HPI/OVERNIGHT EVENTS:    Patient seen and evaluated at bedside, lethargic, able to arouse with tactile stimuli.  Cheyne-Beckwith breathing.    Allergies    No Known Allergies    Intolerances      ADVANCE DIRECTIVES:    DNR Yes    MOLST [x] YES [ ] NO     MEDICATIONS  (STANDING):  lidocaine   Patch 1 Patch Transdermal daily  acetaminophen   Tablet. 650 milliGRAM(s) Oral every 6 hours  HYDROmorphone  Injectable 0.2 milliGRAM(s) IV Push every 6 hours    MEDICATIONS  (PRN):  HYDROmorphone  Injectable 0.5 milliGRAM(s) IV Push every 3 hours PRN Moderate Pain (4 - 6)  bisacodyl Suppository 10 milliGRAM(s) Rectal daily PRN Constipation      PRESENT SYMPTOMS:  Source: [ ] Patient   [ ] Family   [ ] Team     Pain:                        [ ] No [x] Yes             [ ] Mild [x] Moderate [ ] Severe    Dyspnea:                [x] No [ ] Yes             [ ] Mild [ ] Moderate [ ] Severe    Anxiety:                  [x] No [ ] Yes             [ ] Mild [ ] Moderate [ ] Severe    Fatigue:                  [x] No [ ] Yes             [ ] Mild [ ] Moderate [ ] Severe    Nausea:                  [x] No [ ] Yes             [ ] Mild [ ] Moderate [ ] Severe    Loss of appetite:   [x] No [ ] Yes             [ ] Mild [ ] Moderate [ ] Severe    Constipation:        [x] No [ ] Yes             [ ] Mild [ ] Moderate [ ] Severe    Other Symptoms:  [ ] All other review of systems negative   [x] Unable to obtain due to poor mentation     Karnofsky Performance Score/Palliative Performance Status Version 2:  10-20  %    PHYSICAL EXAM:  Vital Signs Last 24 Hrs  T(C): 37 (28 Jun 2017 04:49), Max: 37.3 (27 Jun 2017 17:16)  T(F): 9 (28 Jun 2017 14:02), Max: 99.1 (27 Jun 2017 17:16)  HR: 78 (28 Jun 2017 04:49) (75 - 78)  BP: 110/60 (28 Jun 2017 04:49) (100/57 - 118/47)  BP(mean): --  RR: 24 (28 Jun 2017 14:02) (17 - 24)  SpO2: 98% (28 Jun 2017 14:02) (98% - 100%) I&O's Summary    27 Jun 2017 07:01  -  28 Jun 2017 07:00  --------------------------------------------------------  IN: 150 mL / OUT: 350 mL / NET: -200 mL        General:  [ ] Alert  [ ] Oriented x      [x] Lethargic  [ ] Agitated   [ ] Cachexia   [ ] Unarousable  [ ] Verbal  [ ] Non-Verbal    HEENT:  [ ] Normal   [x] Dry mouth   [ ] ET Tube    [ ] Trach  [ ] Oral lesions    Lungs:   [ ] Clear [ ] Tachypnea  [ ] Audible excessive secretions   [x] Rhonchi        [ ] Right [ ] Left [ ] Bilateral  [ ] Crackles        [ ] Right [ ] Left [ ] Bilateral  [ ] Wheezing     [ ] Right [ ] Left [ ] Bilateral    Cardiovascular:  [x] Regular [ ] Irregular [ ] Tachycardia   [ ] Bradycardia  [ ] Murmur [ ] Other    Abdomen: [x] Soft  [ ] Distended   [ ] +BS  [ ] Non tender [ ] Tender  [ ]PEG   [ ]OGT/ NGT   Last BM:       Genitourinary: [ ] Normal [ ] Incontinent   [ ] Oliguria/Anuria   [x] Guerra    Musculoskeletal:  [x] Normal   [ ] Weakness  [ ] Bedbound/Wheelchair bound [ ] Edema    Neurological: [x] No focal deficits  [ ] Cognitive impairment  [ ] Dysphagia [ ] Dysarthria [ ] Paresis [ ] Other     Skin: [ ] Normal   [ ] Pressure ulcer(s)                  [ ] Rash --> edema and multiple ecchymosis       Oral Intake: [ ] Unable/mouth care only [x] Minimal [ ] Moderate [ ] Full Capability    Diet: [ ] NPO [ ] Tube feeds [ ] TPN [ ] Other     RADIOLOGY & ADDITIONAL STUDIES:    REFERRALS:   [x] Chaplaincy  [x] Hospice  [ ] Child Life  [ ] Social Work  [ ] Case management [ ] Holistic Therapy

## 2017-06-28 NOTE — PROGRESS NOTE ADULT - PROBLEM SELECTOR PLAN 6
GOC discussed with family  Goal is full comfort care, no further blood draws and titrate pain medications as needed.   Inpatient hospice transfer pending
1. Pain control.  2. Limit anticholinergics.  3. Palliative care f/u.

## 2017-06-28 NOTE — CHART NOTE - NSCHARTNOTEFT_GEN_A_CORE
Called by nursing staff to present case to medical examiner. 89 year old female with multiple medical problems and hx of multiple falls with fractures. Medical examiner Elena will contact [facility] to obtain more history. case pending. no definitive answer Called by nursing staff to present case to medical examiner. 89 year old female with multiple medical problems and hx of multiple falls with fractures. Medical examiner Elena will contact [facility] to obtain more history. case pending. no definitive answer.      RANJITH Duran [id number]

## 2017-06-28 NOTE — DISCHARGE NOTE ADULT - HOSPITAL COURSE
89F s/p multiple mechanical falls this week. +Head trauma. +LOC. GCS 13. Upon admission, SBP was in the 60s-70s. Pt received 3 liters IVF, 2 units pRBCs, and 1 unit FFP. Her blood pressure responded. A R IJ central line was placed for additional access. After blood penmressure was stabilized primary survey was intact. Secondary survey notable for ecchymosis on b/l UE, skin tears on R hand and R knee, dried blood in  R ear, pelvic binder in place. She was found to have h b/l iliac wing fractures, multiple compression fractures of indeterminate age.  No operative intervention recommended. ER attending discussed with family over the phone and pt was made DNR/DNI. She was admitted to SICU for observation.  HCT stablilized and patient remained hemodynamically stable.  Pelvic binder was applied for pelvic wing fractures. Palliative care was consulted for goals of care.  It was decided to transfer patient to inpatient dospice with comfort care measures only.  Pain was controlled on standing IV dilaudid and Lidoderm patch.  She was transferred to the PCU.

## 2017-06-28 NOTE — PROGRESS NOTE ADULT - PROBLEM SELECTOR PLAN 4
PPSV 10-20%.   Deconditioned and worsening functional status especially s/p fall.
OK to hold Gleevec if not consistent with current goals of care. CML also likely contributing to anemia.

## 2017-06-28 NOTE — PROGRESS NOTE ADULT - ASSESSMENT
89 year old Female with Medical history of CML, CAD, Macular degeneration, OA, Pacemaker from sick sinus sx. Patient was transferred from [facility] after multiple mechanical falls this week. Multiple fractures evidenced in XR survey. Evaluated by surgery who did not recommend any surgical treatment. Patient made DNR/DNI in the ED after family discussion.
89 year old female with history of CML on Gleevec, CAD, SSS s/p PPM who presented to our ER after transfer from [facility] after multiple falls at home this week, found to have hemorrhagic shock with multiple rib fractures, flank hematoma, and age-indeterminate compression fractures of multiple vertebra. Family wishes to pursue non-aggressive measures, DNR/DNI.
89 year old female with history of CML on Gleevec, CAD, SSS s/p PPM who presented to our ER after transfer from [facility] after multiple falls at home this week, found to have hemorrhagic shock with multiple rib fractures, flank hematoma, and age-indeterminate compression fractures of multiple vertebra. Family wishes to pursue non-aggressive measures, DNR/DNI.  - pain control  - hold antihypertensive meds if bp still soft  - incentive spirometry  - transfer to PCU when bed available
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 Family wishes to pursue non-aggressive measures, DNR/DNI.  - pain control  - hold antihypertensive meds if bp still soft  - f/u palliative care  - incentive spirometry  - transfer to PCU when bed available
89F s/p fall with L 8-9 rib fx, R hematoma, T1, T12, C2-3 compression fx, B/L iliac fx. Pt for supportive care. Family on board.   - multimodal pain control  - no VTE ppx  - Reg diet  - AM labs      Berenice Selby PA-C
89F s/p fall with L 8-9 rib fx, R hematoma, T1, T12, C2-3 compression fx, B/L iliac fx. Pt for supportive care. Family on board.   - multimodal pain control  - no VTE ppx  - Reg diet  - AM labs  - Reevaluate for removal of IJ, if peripherals are intact      Berenice Selby PA-C

## 2017-06-28 NOTE — PROGRESS NOTE ADULT - PROBLEM SELECTOR PLAN 7
1. Maintain Guerra for comfort.  2. Trend BMP if consistent with goals of care.  3. Monitor Is and Os.  4. Renal dosing of medications.

## 2017-06-28 NOTE — PROGRESS NOTE ADULT - ATTENDING COMMENTS
Patient seen and examined. Patient with changes in respiration and increasing pain. Awaiting inpatient hospice.

## 2017-06-28 NOTE — H&P ADULT - ASSESSMENT
89 year old Female with Medical history of CML, CAD, Macular degeneration, OA, Pacemaker from sick sinus sx. Patient was transferred from [facility] after multiple mechanical falls this week. Multiple fractures evidenced in XR survey. Evaluated by surgery who did not recommend any surgical treatment. Patient made DNR/DNI in the ED after family discussion. Pt brought to PCU with hospice care for continued care and symptom management

## 2017-06-28 NOTE — PROGRESS NOTE ADULT - PROBLEM SELECTOR PLAN 2
No surgical intervention recommended.  Pain management regimen.
s/p hemorrhagic shock, multiple units of pRBC, clinically stable.  1. Limit blood draws.  2. Monitor RLE for swelling/neurovascular changes.  3. Hold aspirin.

## 2017-06-28 NOTE — DISCHARGE NOTE FOR THE EXPIRED PATIENT - HOSPITAL COURSE
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 Family wishes to pursue non-aggressive measures and was admitted to PCU for symptom management. The patients AICD was deactivated per request of family. She was placed on a dilaudid drip. Patient  and family was notified.

## 2017-06-28 NOTE — DISCHARGE NOTE ADULT - MEDICATION SUMMARY - MEDICATIONS TO TAKE
I will START or STAY ON the medications listed below when I get home from the hospital:    acetaminophen 325 mg oral tablet  -- 2 tab(s) by mouth every 6 hours  -- Indication: For Pain    HYDROmorphone  -- 0.2 milligram(s) intravenous every 6 hours  -- Indication: For Pain    HYDROmorphone  -- 0.5 milligram(s) injectable every 3 hours, As Needed moderate pain  -- Indication: For Pain    lidocaine 5% topical film  -- Apply on skin to affected area once a day  -- Indication: For Pain    bisacodyl 10 mg rectal suppository  -- 1 suppository(ies) rectally once a day, As needed, Constipation  -- Indication: For Constipation

## 2017-06-28 NOTE — PROGRESS NOTE ADULT - PROBLEM SELECTOR PLAN 5
Patient is DNR/DNI waiting for transfer to inpatient hospice --> PCU unit.  Being followed by chaplaincy - patient is Druze.
1. OK to hold ASA.  2. Hold lasix given hypotension.

## 2017-06-28 NOTE — PROGRESS NOTE ADULT - PROBLEM SELECTOR PLAN 3
Currently on Hydromorphone 0.2mg IV Q6 hrs   Hydromorphone 0.5mg IV Q3hrs PRN
1. Pain control as above.  2. Bedrest for now.

## 2017-06-28 NOTE — PROGRESS NOTE ADULT - PROBLEM SELECTOR PLAN 1
Gleevec held as not consistent with current goals of care.   CML also likely contributing to anemia.  S/p 2 PRBCs, 1 unit of FFP, 1 PLT.  Comfort care to be main goal.
Plan of care discussed with palliative care team. Primary goal is comfort. On standing dilaudid regimen for pain.  1. C/w lidoderm patch, tylenol as tolerated.  2. Incentive spirometry as tolerated.  3. O2 support as needed.  4. Plan for possible PCU transfer for further symptom control.

## 2017-06-28 NOTE — DISCHARGE NOTE ADULT - CARE PLAN
Principal Discharge DX:	Trauma  Goal:	comfort care  Instructions for follow-up, activity and diet:	Comfort care in palliative care unit

## 2017-06-28 NOTE — DISCHARGE NOTE ADULT - PATIENT PORTAL LINK FT
“You can access the Follow[employer] Patient Portal, offered by [facility], by registering with the following website: [email or web address]”

## 2017-07-01 LAB
CULTURE RESULTS: SIGNIFICANT CHANGE UP
CULTURE RESULTS: SIGNIFICANT CHANGE UP
SPECIMEN SOURCE: SIGNIFICANT CHANGE UP
SPECIMEN SOURCE: SIGNIFICANT CHANGE UP

## 2017-08-11 RX ORDER — HYDROMORPHONE HYDROCHLORIDE 2 MG/ML
0.5 INJECTION INTRAMUSCULAR; INTRAVENOUS; SUBCUTANEOUS
Qty: 0 | Refills: 0 | COMMUNITY

## 2018-12-26 NOTE — ED ADULT NURSE NOTE - NEURO WDL
Normal gait / station Alert and oriented to person, place and time, memory intact, behavior appropriate to situation, PERRL.

## 2019-10-30 NOTE — DISCHARGE NOTE ADULT - NS MD DC FALL RISK RISK
Preventive Health Recommendations  Female Ages 26 - 39  Yearly exam:   See your health care provider every year in order to    Review health changes.     Discuss preventive care.      Review your medicines if you your doctor has prescribed any.    Until age 30: Get a Pap test every three years (more often if you have had an abnormal result).    After age 30: Talk to your doctor about whether you should have a Pap test every 3 years or have a Pap test with HPV screening every 5 years.   You do not need a Pap test if your uterus was removed (hysterectomy) and you have not had cancer.  You should be tested each year for STDs (sexually transmitted diseases), if you're at risk.   Talk to your provider about how often to have your cholesterol checked.  If you are at risk for diabetes, you should have a diabetes test (fasting glucose).  Shots: Get a flu shot each year. Get a tetanus shot every 10 years.   Nutrition:     Eat at least 5 servings of fruits and vegetables each day.    Eat whole-grain bread, whole-wheat pasta and brown rice instead of white grains and rice.    Get adequate Calcium and Vitamin D.     Lifestyle    Exercise at least 150 minutes a week (30 minutes a day, 5 days of the week). This will help you control your weight and prevent disease.    Limit alcohol to one drink per day.    No smoking.     Wear sunscreen to prevent skin cancer.    See your dentist every six months for an exam and cleaning.     For information on Fall & Injury Prevention, visit [email or web address]

## 2020-07-01 NOTE — ED ADULT TRIAGE NOTE - HEIGHT IN CM
Detail Level: Detailed Quality 265: Biopsy Follow-Up: Biopsy results reviewed, communicated, tracked, and documented 152.4

## 2021-08-25 NOTE — DISCHARGE NOTE ADULT - MEDICATION SUMMARY - MEDICATIONS TO CHANGE
I will SWITCH the dose or number of times a day I take the medications listed below when I get home from the hospital:  None
Willard Tarango)  Orthopaedic Surgery  [address] [place] [address]  Phone: [phone]  Fax: [phone]  Follow Up Time:

## 2023-10-04 NOTE — CONSULT NOTE ADULT - CONSULT REQUESTED DATE/TIME
27-Jun-2017 15:38
26-Jun-2017 04:30
27-Jun-2017 12:38
Complex Repair And O-T Advancement Flap Text: The defect edges were debeveled with a #15 scalpel blade.  The primary defect was closed partially with a complex linear closure.  Given the location of the remaining defect, shape of the defect and the proximity to free margins an O-T advancement flap was deemed most appropriate for complete closure of the defect.  Using a sterile surgical marker, an appropriate advancement flap was drawn incorporating the defect and placing the expected incisions within the relaxed skin tension lines where possible. The area thus outlined was incised deep to adipose tissue with a #15 scalpel blade. The skin margins were undermined to an appropriate distance in all directions utilizing iris scissors and carried over to close the primary defect.